# Patient Record
Sex: FEMALE | Employment: OTHER | URBAN - METROPOLITAN AREA
[De-identification: names, ages, dates, MRNs, and addresses within clinical notes are randomized per-mention and may not be internally consistent; named-entity substitution may affect disease eponyms.]

---

## 2017-08-29 ENCOUNTER — APPOINTMENT (OUTPATIENT)
Dept: CT IMAGING | Age: 62
End: 2017-08-29
Attending: EMERGENCY MEDICINE
Payer: COMMERCIAL

## 2017-08-29 ENCOUNTER — APPOINTMENT (OUTPATIENT)
Dept: GENERAL RADIOLOGY | Age: 62
End: 2017-08-29
Attending: PHYSICIAN ASSISTANT
Payer: COMMERCIAL

## 2017-08-29 ENCOUNTER — HOSPITAL ENCOUNTER (EMERGENCY)
Age: 62
Discharge: HOME | End: 2017-08-29
Attending: EMERGENCY MEDICINE
Payer: COMMERCIAL

## 2017-08-29 VITALS
HEART RATE: 54 BPM | BODY MASS INDEX: 19.16 KG/M2 | WEIGHT: 115 LBS | TEMPERATURE: 98.4 F | SYSTOLIC BLOOD PRESSURE: 196 MMHG | RESPIRATION RATE: 17 BRPM | HEIGHT: 65 IN | DIASTOLIC BLOOD PRESSURE: 112 MMHG | OXYGEN SATURATION: 100 %

## 2017-08-29 DIAGNOSIS — D69.6 TEMPORARY LOW PLATELET COUNT (HCC): ICD-10-CM

## 2017-08-29 DIAGNOSIS — J18.9 CAP (COMMUNITY ACQUIRED PNEUMONIA): Primary | ICD-10-CM

## 2017-08-29 LAB
ALBUMIN SERPL-MCNC: 3.4 G/DL (ref 3.5–5)
ALBUMIN/GLOB SERPL: 0.7 {RATIO} (ref 1.1–2.2)
ALP SERPL-CCNC: 146 U/L (ref 45–117)
ALT SERPL-CCNC: 30 U/L (ref 12–78)
ANION GAP SERPL CALC-SCNC: 7 MMOL/L (ref 5–15)
APPEARANCE UR: CLEAR
AST SERPL-CCNC: 55 U/L (ref 15–37)
BASOPHILS # BLD: 0 K/UL (ref 0–0.1)
BASOPHILS NFR BLD: 0 % (ref 0–1)
BILIRUB SERPL-MCNC: 0.4 MG/DL (ref 0.2–1)
BILIRUB UR QL: NEGATIVE
BUN SERPL-MCNC: 16 MG/DL (ref 6–20)
BUN/CREAT SERPL: 12 (ref 12–20)
CALCIUM SERPL-MCNC: 8.7 MG/DL (ref 8.5–10.1)
CHLORIDE SERPL-SCNC: 99 MMOL/L (ref 97–108)
CO2 SERPL-SCNC: 27 MMOL/L (ref 21–32)
COLOR UR: NORMAL
CREAT SERPL-MCNC: 1.34 MG/DL (ref 0.55–1.02)
EOSINOPHIL # BLD: 0.2 K/UL (ref 0–0.4)
EOSINOPHIL NFR BLD: 4 % (ref 0–7)
ERYTHROCYTE [DISTWIDTH] IN BLOOD BY AUTOMATED COUNT: 12.3 % (ref 11.5–14.5)
GLOBULIN SER CALC-MCNC: 5 G/DL (ref 2–4)
GLUCOSE SERPL-MCNC: 92 MG/DL (ref 65–100)
GLUCOSE UR STRIP.AUTO-MCNC: NEGATIVE MG/DL
HCT VFR BLD AUTO: 35.4 % (ref 35–47)
HGB BLD-MCNC: 12.2 G/DL (ref 11.5–16)
HGB UR QL STRIP: NEGATIVE
INR PPP: 1.1 (ref 0.9–1.1)
KETONES UR QL STRIP.AUTO: NEGATIVE MG/DL
LEUKOCYTE ESTERASE UR QL STRIP.AUTO: NEGATIVE
LYMPHOCYTES # BLD: 2.3 K/UL (ref 0.8–3.5)
LYMPHOCYTES NFR BLD: 51 % (ref 12–49)
MAGNESIUM SERPL-MCNC: 2.1 MG/DL (ref 1.6–2.4)
MCH RBC QN AUTO: 33.5 PG (ref 26–34)
MCHC RBC AUTO-ENTMCNC: 34.5 G/DL (ref 30–36.5)
MCV RBC AUTO: 97.3 FL (ref 80–99)
MONOCYTES # BLD: 0.7 K/UL (ref 0–1)
MONOCYTES NFR BLD: 16 % (ref 5–13)
NEUTS SEG # BLD: 1.3 K/UL (ref 1.8–8)
NEUTS SEG NFR BLD: 29 % (ref 32–75)
NITRITE UR QL STRIP.AUTO: NEGATIVE
PH UR STRIP: 5.5 [PH] (ref 5–8)
PLATELET # BLD AUTO: 87 K/UL (ref 150–400)
POTASSIUM SERPL-SCNC: 4.2 MMOL/L (ref 3.5–5.1)
PROT SERPL-MCNC: 8.4 G/DL (ref 6.4–8.2)
PROT UR STRIP-MCNC: NEGATIVE MG/DL
PROTHROMBIN TIME: 11.4 SEC (ref 9–11.1)
RBC # BLD AUTO: 3.64 M/UL (ref 3.8–5.2)
SODIUM SERPL-SCNC: 133 MMOL/L (ref 136–145)
SP GR UR REFRACTOMETRY: <1.005 (ref 1–1.03)
TROPONIN I SERPL-MCNC: <0.04 NG/ML
UROBILINOGEN UR QL STRIP.AUTO: 0.2 EU/DL (ref 0.2–1)
WBC # BLD AUTO: 4.5 K/UL (ref 3.6–11)

## 2017-08-29 PROCEDURE — 96360 HYDRATION IV INFUSION INIT: CPT

## 2017-08-29 PROCEDURE — 84484 ASSAY OF TROPONIN QUANT: CPT | Performed by: PHYSICIAN ASSISTANT

## 2017-08-29 PROCEDURE — 94762 N-INVAS EAR/PLS OXIMTRY CONT: CPT

## 2017-08-29 PROCEDURE — 74011250636 HC RX REV CODE- 250/636: Performed by: PHYSICIAN ASSISTANT

## 2017-08-29 PROCEDURE — 93005 ELECTROCARDIOGRAM TRACING: CPT

## 2017-08-29 PROCEDURE — 74011250637 HC RX REV CODE- 250/637: Performed by: PHYSICIAN ASSISTANT

## 2017-08-29 PROCEDURE — 85610 PROTHROMBIN TIME: CPT | Performed by: PHYSICIAN ASSISTANT

## 2017-08-29 PROCEDURE — 71275 CT ANGIOGRAPHY CHEST: CPT

## 2017-08-29 PROCEDURE — 83735 ASSAY OF MAGNESIUM: CPT | Performed by: PHYSICIAN ASSISTANT

## 2017-08-29 PROCEDURE — 85025 COMPLETE CBC W/AUTO DIFF WBC: CPT | Performed by: PHYSICIAN ASSISTANT

## 2017-08-29 PROCEDURE — 99285 EMERGENCY DEPT VISIT HI MDM: CPT

## 2017-08-29 PROCEDURE — 80053 COMPREHEN METABOLIC PANEL: CPT | Performed by: PHYSICIAN ASSISTANT

## 2017-08-29 PROCEDURE — 74011636320 HC RX REV CODE- 636/320: Performed by: RADIOLOGY

## 2017-08-29 PROCEDURE — 81003 URINALYSIS AUTO W/O SCOPE: CPT | Performed by: PHYSICIAN ASSISTANT

## 2017-08-29 PROCEDURE — 71020 XR CHEST PA LAT: CPT

## 2017-08-29 PROCEDURE — L3809 WHFO W/O JOINTS PRE OTS: HCPCS

## 2017-08-29 PROCEDURE — 36415 COLL VENOUS BLD VENIPUNCTURE: CPT | Performed by: PHYSICIAN ASSISTANT

## 2017-08-29 RX ORDER — CLONIDINE HYDROCHLORIDE 0.1 MG/1
0.2 TABLET ORAL
Status: COMPLETED | OUTPATIENT
Start: 2017-08-29 | End: 2017-08-29

## 2017-08-29 RX ORDER — AMLODIPINE BESYLATE 5 MG/1
5 TABLET ORAL DAILY
COMMUNITY

## 2017-08-29 RX ORDER — LEVOFLOXACIN 750 MG/1
750 TABLET ORAL DAILY
COMMUNITY
End: 2022-01-07

## 2017-08-29 RX ORDER — CLONIDINE HYDROCHLORIDE 0.2 MG/1
0.2 TABLET ORAL 2 TIMES DAILY
COMMUNITY

## 2017-08-29 RX ADMIN — IOPAMIDOL 84 ML: 755 INJECTION, SOLUTION INTRAVENOUS at 19:18

## 2017-08-29 RX ADMIN — CLONIDINE HYDROCHLORIDE 0.2 MG: 0.1 TABLET ORAL at 17:54

## 2017-08-29 RX ADMIN — SODIUM CHLORIDE 1000 ML: 900 INJECTION, SOLUTION INTRAVENOUS at 18:59

## 2017-08-29 NOTE — ED PROVIDER NOTES
HPI Comments: Angelika Arnold is a 60 y.o. female who presents ambulatory to the ED with a c/o being sent by her pcp for a low platelet/ abnormal labs. Pt notes she was inpatient at [facility] last week for pna. She was at her pcp yesterday for a follow up appt. Dr Leah Mahoney called and sent pt to the ER based on her labs. She states she has continued to feel fatigued and felt like she could fall x 2 today. She states she still has sob. She is talking levaquin for her infection. She states she normally takes clonidine and norvasc for her bp and did not take her medications today as she did not feel well. Pt notes she had dark stools when she was admitted to [facility], but denies dark stool now. She had loose stools 2 days ago and reports constipation since. She denies f/c, cp, abd pain, dysuria, hematuria, rash or weight loss. Pt denies a hx of bleeding problems in the past. Pt states she did not want to go back to [facility] as they \"did not make me feel better or figure out what was wrong with me. \"    PCP: Faith Solano MD  PMHx significant for: Past Medical History:  No date: Hypertension  PSHx significant for: . History reviewed. No pertinent surgical history. Social Hx: Tobacco: denies current EtOH: 8+ beers daily Illicit drug use: denies     There are no further complaints or symptoms at this time. The history is provided by the patient and a relative (son). No past medical history on file. No past surgical history on file. No family history on file. Social History     Social History    Marital status: N/A     Spouse name: N/A    Number of children: N/A    Years of education: N/A     Occupational History    Not on file.      Social History Main Topics    Smoking status: Not on file    Smokeless tobacco: Not on file    Alcohol use Not on file    Drug use: Not on file    Sexual activity: Not on file     Other Topics Concern    Not on file     Social History Narrative ALLERGIES: Review of patient's allergies indicates no known allergies. Review of Systems   Constitutional: Positive for fatigue. Negative for chills and fever. HENT: Negative for congestion, rhinorrhea, sneezing and sore throat. Eyes: Negative for redness and visual disturbance. Respiratory: Positive for shortness of breath. Cardiovascular: Negative for chest pain and leg swelling. Gastrointestinal: Positive for diarrhea and nausea. Negative for abdominal pain and vomiting. Genitourinary: Negative for difficulty urinating and frequency. Musculoskeletal: Negative for back pain, myalgias and neck stiffness. Skin: Negative for rash. Neurological: Positive for weakness. Negative for dizziness, syncope and headaches. Hematological: Negative for adenopathy. Patient Vitals for the past 12 hrs:   Temp Pulse Resp BP SpO2   08/29/17 1845 - 61 15 (!) 163/103 99 %   08/29/17 1830 - 60 15 (!) 162/107 100 %   08/29/17 1815 - 65 13 (!) 213/116 99 %   08/29/17 1754 - 60 - (!) 183/109 -   08/29/17 1745 - 61 11 (!) 204/106 100 %   08/29/17 1710 - - - (!) 198/115 -   08/29/17 1709 98.4 °F (36.9 °C) 67 16 (!) 217/121 100 %              Physical Exam   Constitutional: She is oriented to person, place, and time. She appears well-developed and well-nourished. No distress. Chronically ill appearing   HENT:   Head: Normocephalic and atraumatic. Right Ear: External ear normal.   Left Ear: External ear normal.   Nose: Nose normal.   Mouth/Throat: Oropharynx is clear and moist. No oropharyngeal exudate. Eyes: EOM are normal. Pupils are equal, round, and reactive to light. Neck: Neck supple. No JVD present. No tracheal deviation present. Cardiovascular: Normal rate, regular rhythm, normal heart sounds and intact distal pulses. Exam reveals no gallop and no friction rub. No murmur heard. Pulmonary/Chest: Effort normal and breath sounds normal. No stridor. No respiratory distress.  She has no wheezes. She has no rales. She exhibits no tenderness. Abdominal: Soft. Bowel sounds are normal. She exhibits no distension and no mass. There is no tenderness. There is no rebound and no guarding. Musculoskeletal: Normal range of motion. She exhibits no edema, tenderness or deformity. Lymphadenopathy:     She has no cervical adenopathy. Neurological: She is alert and oriented to person, place, and time. No cranial nerve deficit. Coordination normal.   Skin: No rash noted. No erythema. No pallor. Psychiatric: She has a normal mood and affect. Her behavior is normal.   Nursing note and vitals reviewed. MDM  Number of Diagnoses or Management Options     Amount and/or Complexity of Data Reviewed  Clinical lab tests: reviewed and ordered  Tests in the radiology section of CPT®: ordered and reviewed  Tests in the medicine section of CPT®: ordered and reviewed  Obtain history from someone other than the patient: yes (son)  Review and summarize past medical records: yes  Independent visualization of images, tracings, or specimens: yes    Patient Progress  Patient progress: stable    ED Course       Procedures    5:29 PM  Discussed with patient at length the need for blood pressure re-evaluation and management with primary care. Discussed the long term sequelae for elevated blood pressure including blindness, CVA, MI, and renal failure/ dialysis. Pt agrees to follow up as directed. ALANNA Luna     5:30 PM  Discussed with the patient the medical risks of excessive daily alcohol intake and advised the patient of the benefits of the decreasing alcohol intake  ALANNA uLna    5:31 PM  Discussed pt, sx, hx and current findings with Dr Doreen Narvaez. He is in agreement with plan and will see pt  Nicol Hercules PA-C      ED EKG interpretation: 6:13PM  Rhythm: sinus bradycardia with sinus arrhythmia; and regular . Rate (approx.): 59;  Axis: normal; P wave: normal; QRS interval: normal ; ST/T wave: non-specific ST changes; Other findings: abnormal ekg, LVH. This EKG was interpreted by Sathish Acosta MD,ED Provider. 7:05 PM  Discussed pt's hx, disposition, and available diagnostic and imaging results with Dr Kendra Acosta. Reviewed care plans. Agrees with plans as outlined. Care of pt transferred to Dr Kendra Acosta. Irvin Manjarrez.  VIOLETA Hercules

## 2017-08-29 NOTE — ED TRIAGE NOTES
Pt was sent here by her PCP for follow up on PNA and possibly low platelets. Pt c/o generalized weakness and SOB for a couple of months. Denies n/v/d, and chest pain.

## 2017-08-30 LAB
ATRIAL RATE: 59 BPM
CALCULATED P AXIS, ECG09: 49 DEGREES
CALCULATED R AXIS, ECG10: 70 DEGREES
CALCULATED T AXIS, ECG11: 67 DEGREES
DIAGNOSIS, 93000: NORMAL
P-R INTERVAL, ECG05: 162 MS
Q-T INTERVAL, ECG07: 426 MS
QRS DURATION, ECG06: 90 MS
QTC CALCULATION (BEZET), ECG08: 421 MS
VENTRICULAR RATE, ECG03: 59 BPM

## 2017-08-30 NOTE — ED NOTES
Patient given discharge instruction by Anselmo Hamilton.   Verbalized understanding, pt discharge home with family

## 2017-08-30 NOTE — PROGRESS NOTES
8:01 PM discussed need to finish levaquin as prescribed (started yesterday); also discussed etoh abuse/ low platelets; Pt agrees w/ plans;   Ana Huitron's  results have been reviewed with her. She has been counseled regarding her diagnosis. She verbally conveys understanding and agreement of the signs, symptoms, diagnosis, treatment and prognosis and additionally agrees to Call/ Arrange follow up as recommended with Dr. Solo Fox MD in 24 - 48 hours. She also agrees with the care-plan and conveys that all of her questions have been answered. I have also put together some discharge instructions for her that include: 1) educational information regarding their diagnosis, 2) how to care for their diagnosis at home, as well a 3) list of reasons why they would want to return to the ED prior to their follow-up appointment, should their condition change or for concerns.

## 2017-08-30 NOTE — DISCHARGE INSTRUCTIONS
Pneumonia: Care Instructions  Your Care Instructions    Pneumonia is an infection of the lungs. Most cases are caused by infections from bacteria or viruses. Pneumonia may be mild or very severe. If it is caused by bacteria, you will be treated with antibiotics. It may take a few weeks to a few months to recover fully from pneumonia, depending on how sick you were and whether your overall health is good. Follow-up care is a key part of your treatment and safety. Be sure to make and go to all appointments, and call your doctor if you are having problems. Its also a good idea to know your test results and keep a list of the medicines you take. How can you care for yourself at home? · Take your antibiotics exactly as directed. Do not stop taking the medicine just because you are feeling better. You need to take the full course of antibiotics. · Take your medicines exactly as prescribed. Call your doctor if you think you are having a problem with your medicine. · Get plenty of rest and sleep. You may feel weak and tired for a while, but your energy level will improve with time. · To prevent dehydration, drink plenty of fluids, enough so that your urine is light yellow or clear like water. Choose water and other caffeine-free clear liquids until you feel better. If you have kidney, heart, or liver disease and have to limit fluids, talk with your doctor before you increase the amount of fluids you drink. · Take care of your cough so you can rest. A cough that brings up mucus from your lungs is common with pneumonia. It is one way your body gets rid of the infection. But if coughing keeps you from resting or causes severe fatigue and chest-wall pain, talk to your doctor. He or she may suggest that you take a medicine to reduce the cough. · Use a vaporizer or humidifier to add moisture to your bedroom. Follow the directions for cleaning the machine. · Do not smoke or allow others to smoke around you.  Smoke will make your cough last longer. If you need help quitting, talk to your doctor about stop-smoking programs and medicines. These can increase your chances of quitting for good. · Take an over-the-counter pain medicine, such as acetaminophen (Tylenol), ibuprofen (Advil, Motrin), or naproxen (Aleve). Read and follow all instructions on the label. · Do not take two or more pain medicines at the same time unless the doctor told you to. Many pain medicines have acetaminophen, which is Tylenol. Too much acetaminophen (Tylenol) can be harmful. · If you were given a spirometer to measure how well your lungs are working, use it as instructed. This can help your doctor tell how your recovery is going. · To prevent pneumonia in the future, talk to your doctor about getting a flu vaccine (once a year) and a pneumococcal vaccine (one time only for most people). When should you call for help? Call 911 anytime you think you may need emergency care. For example, call if:  · You have severe trouble breathing. Call your doctor now or seek immediate medical care if:  · You cough up dark brown or bloody mucus (sputum). · You have new or worse trouble breathing. · You are dizzy or lightheaded, or you feel like you may faint. Watch closely for changes in your health, and be sure to contact your doctor if:  · You have a new or higher fever. · You are coughing more deeply or more often. · You are not getting better after 2 days (48 hours). · You do not get better as expected. Where can you learn more? Go to http://dexter-tank.info/. Enter [id number] in the search box to learn more about \"Pneumonia: Care Instructions. \"  Current as of: March 25, 2017  Content Version: 11.3  © 6966-3483 [employer]. Care instructions adapted under license by [employer] (which disclaims liability or warranty for this information).  If you have questions about a medical condition or this instruction, always ask your healthcare professional. [place] [address] any warranty or liability for your use of this information. Alcohol and Drug Problems: Care Instructions  Your Care Instructions  You can improve your life and health by stopping your use of alcohol or drugs. Ending dependency on alcohol or drugs may help you feel and sleep better. You may get along better with your family, friends, and coworkers. There are medicines and programs that can help. Follow-up care is a key part of your treatment and safety. Be sure to make and go to all appointments, and call your doctor if you are having problems. It's also a good idea to know your test results and keep a list of the medicines you take. How can you care for yourself at home? · If you have been given medicine to help keep you sober or reduce your cravings, be sure to take it as prescribed. · Talk to your doctor about programs that can help you stop using drugs or drinking alcohol. · If your doctor prescribes disulfiram (Antabuse), do not drink any alcohol while you are taking this medicine. You may have severe, even life-threatening, side effects from even small amounts of alcohol. · Do not tempt yourself by keeping alcohol or drugs in your home. · Learn how to say no when other people drink or use drugs. Or don't spend time with people who drink or use drugs. · Use the time and money spent on drinking or drugs to do something fun with your family or friends. Preventing a relapse  · Do not drink alcohol or use drugs at all. Using any amount of alcohol or drugs greatly increases your risk for relapse. · Seek help from organizations such as Alcoholics Anonymous, Narcotics Anonymous, or treatment facilities if you feel the need to drink alcohol or use drugs again. · Remember that recovery is a lifelong process. · Stay away from situations, friends, or places that may lead you to drink or use drugs.   · Have a plan to spot and deal with relapse. Learn to recognize changes in your thinking that lead you to drink or use drugs. These are warning signs. Get help before you start to drink or use drugs again. · Get help as soon as you can if you relapse. Some people make a plan with another person that outlines what they want that person to do for them if they relapse. The plan usually includes how to handle the relapse and who to notify in case of relapse. · Don't give up. Remember that a relapse does not mean that you have failed. Use the experience to learn the triggers that lead you to drink or use drugs. Then quit again. Many people have several relapses before they are able to quit for good. When should you call for help? Call 911 anytime you think you may need emergency care. For example, call if:  · You feel you cannot stop from hurting yourself or someone else. Call your doctor now or seek immediate medical care if:  · You have serious withdrawal symptoms such as confusion, hallucinations, or severe trembling. Watch closely for changes in your health, and be sure to contact your doctor if:  · You have a relapse. · You need more help or support to stop. Where can you learn more? Go to http://dexter-tank.info/. Enter [id number] in the search box to learn more about \"Alcohol and Drug Problems: Care Instructions. \"  Current as of: November 3, 2016  Content Version: 11.3  © 6560-6865 [employer]. Care instructions adapted under license by [employer] (which disclaims liability or warranty for this information). If you have questions about a medical condition or this instruction, always ask your healthcare professional. [place] [address] any warranty or liability for your use of this information. We hope that we have addressed all of your medical concerns.  The examination and treatment you received in the Emergency Department were for an emergent problem and were not intended as complete care. It is important that you follow up with your healthcare provider(s) for ongoing care. If your symptoms worsen or do not improve as expected, and you are unable to reach your usual health care provider(s), you should return to the Emergency Department. Today's healthcare is undergoing tremendous change, and patient satisfaction surveys are one of the many tools to assess the quality of medical care. You may receive a survey from the raksul regarding your experience in the Emergency Department. I hope that your experience has been completely positive, particularly the medical care that I provided. As such, please participate in the survey; anything less than excellent does not meet my expectations or intentions. [facility] and [facility] participate in nationally recognized quality of care measures. If your blood pressure is greater than 120/80, as reported below, we urge that you seek medical care to address the potential of high blood pressure, commonly known as hypertension. Hypertension can be hereditary or can be caused by certain medical conditions, pain, stress, or \"white coat syndrome. \"       Please make an appointment with your health care provider(s) for follow up of your Emergency Department visit. VITALS:   Patient Vitals for the past 8 hrs:   Temp Pulse Resp BP SpO2   08/29/17 1915 - (!) 58 15 (!) 156/103 100 %   08/29/17 1900 - (!) 58 16 (!) 171/105 100 %   08/29/17 1845 - 61 15 (!) 163/103 99 %   08/29/17 1830 - 60 15 (!) 162/107 100 %   08/29/17 1815 - 65 13 (!) 213/116 99 %   08/29/17 1754 - 60 - (!) 183/109 -   08/29/17 1745 - 61 11 (!) 204/106 100 %   08/29/17 1710 - - - (!) 198/115 -   08/29/17 1709 98.4 °F (36.9 °C) 67 16 (!) 217/121 100 %          Thank you for allowing us to provide you with medical care today. We realize that you have many choices for your emergency care needs.   Please choose us in the future for any continued health care needs. Milly Acosta, [facility].   Office: [phone]            Recent Results (from the past 24 hour(s))   PROTHROMBIN TIME + INR    Collection Time: 08/29/17  5:42 PM   Result Value Ref Range    INR 1.1 0.9 - 1.1      Prothrombin time 11.4 (H) 9.0 - 11.1 sec   CBC WITH AUTOMATED DIFF    Collection Time: 08/29/17  5:43 PM   Result Value Ref Range    WBC 4.5 3.6 - 11.0 K/uL    RBC 3.64 (L) 3.80 - 5.20 M/uL    HGB 12.2 11.5 - 16.0 g/dL    HCT 35.4 35.0 - 47.0 %    MCV 97.3 80.0 - 99.0 FL    MCH 33.5 26.0 - 34.0 PG    MCHC 34.5 30.0 - 36.5 g/dL    RDW 12.3 11.5 - 14.5 %    PLATELET 87 (L) 746 - 400 K/uL    NEUTROPHILS 29 (L) 32 - 75 %    LYMPHOCYTES 51 (H) 12 - 49 %    MONOCYTES 16 (H) 5 - 13 %    EOSINOPHILS 4 0 - 7 %    BASOPHILS 0 0 - 1 %    ABS. NEUTROPHILS 1.3 (L) 1.8 - 8.0 K/UL    ABS. LYMPHOCYTES 2.3 0.8 - 3.5 K/UL    ABS. MONOCYTES 0.7 0.0 - 1.0 K/UL    ABS. EOSINOPHILS 0.2 0.0 - 0.4 K/UL    ABS. BASOPHILS 0.0 0.0 - 0.1 K/UL   METABOLIC PANEL, COMPREHENSIVE    Collection Time: 08/29/17  5:43 PM   Result Value Ref Range    Sodium 133 (L) 136 - 145 mmol/L    Potassium 4.2 3.5 - 5.1 mmol/L    Chloride 99 97 - 108 mmol/L    CO2 27 21 - 32 mmol/L    Anion gap 7 5 - 15 mmol/L    Glucose 92 65 - 100 mg/dL    BUN 16 6 - 20 MG/DL    Creatinine 1.34 (H) 0.55 - 1.02 MG/DL    BUN/Creatinine ratio 12 12 - 20      GFR est AA 48 (L) >60 ml/min/1.73m2    GFR est non-AA 40 (L) >60 ml/min/1.73m2    Calcium 8.7 8.5 - 10.1 MG/DL    Bilirubin, total 0.4 0.2 - 1.0 MG/DL    ALT (SGPT) 30 12 - 78 U/L    AST (SGOT) 55 (H) 15 - 37 U/L    Alk.  phosphatase 146 (H) 45 - 117 U/L    Protein, total 8.4 (H) 6.4 - 8.2 g/dL    Albumin 3.4 (L) 3.5 - 5.0 g/dL    Globulin 5.0 (H) 2.0 - 4.0 g/dL    A-G Ratio 0.7 (L) 1.1 - 2.2     MAGNESIUM    Collection Time: 08/29/17  5:43 PM   Result Value Ref Range    Magnesium 2.1 1.6 - 2.4 mg/dL   URINALYSIS W/ RFLX MICROSCOPIC    Collection Time: 08/29/17  5:43 PM   Result Value Ref Range    Color YELLOW/STRAW      Appearance CLEAR CLEAR      Specific gravity <1.005 1.003 - 1.030    pH (UA) 5.5 5.0 - 8.0      Protein NEGATIVE  NEG mg/dL    Glucose NEGATIVE  NEG mg/dL    Ketone NEGATIVE  NEG mg/dL    Bilirubin NEGATIVE  NEG      Blood NEGATIVE  NEG      Urobilinogen 0.2 0.2 - 1.0 EU/dL    Nitrites NEGATIVE  NEG      Leukocyte Esterase NEGATIVE  NEG     TROPONIN I    Collection Time: 08/29/17  5:43 PM   Result Value Ref Range    Troponin-I, Qt. <0.04 <0.05 ng/mL   EKG, 12 LEAD, INITIAL    Collection Time: 08/29/17  6:13 PM   Result Value Ref Range    Ventricular Rate 59 BPM    Atrial Rate 59 BPM    P-R Interval 162 ms    QRS Duration 90 ms    Q-T Interval 426 ms    QTC Calculation (Bezet) 421 ms    Calculated P Axis 49 degrees    Calculated R Axis 70 degrees    Calculated T Axis 67 degrees    Diagnosis       Sinus bradycardia with sinus arrhythmia  Minimal voltage criteria for LVH, may be normal variant  ST elevation, consider early repolarization, pericarditis, or injury  Abnormal ECG  No previous ECGs available         Xr Chest Pa Lat    Result Date: 8/29/2017  EXAM:  XR CHEST PA LAT INDICATION:  sob, recent pna COMPARISON:  None FINDINGS: PA and lateral radiographs of the chest demonstrate patchy airspace disease in the right middle lobe and probably in the left lower lobe, suspicious for pneumonia. The lungs are otherwise clear. The cardiac and mediastinal contours and pulmonary vascularity are normal.   The bones and soft tissues are unremarkable. IMPRESSION: Patchy right middle lobe airspace disease and possible patchy left lower lobe airspace disease. Cta Chest W Or W Wo Cont    Result Date: 8/29/2017  EXAM:  CTA CHEST W OR W WO CONT INDICATION:  sob/ PNA COMPARISON: Chest radiograph of earlier in the day TECHNIQUE: Precontrast  images were obtained to localize the volume for acquisition.  Multislice helical CT arteriography was performed from the diaphragm to the thoracic inlet during uneventful rapid bolus intravenous administration of 84 mL of Isovue  370. Lung and soft tissue windows were generated. Post processing was performed to include 3D MIP  and coronal and sagittal reformatted images. CT dose reduction was achieved through the use of a standardized protocol tailored for this examination and automatic exposure control for dose modulation. FINDINGS: The pulmonary arteries are well enhanced and no pulmonary emboli are identified. No parenchymal nodules or masses are demonstrated. There is no mediastinal or hilar adenopathy or mass. The aorta enhances normally without evidence of aneurysm or dissection. There is patchy right middle lobe airspace disease. The lungs are otherwise clear. There is no pleural or pericardial fluid. No significant abnormalities are seen in the upper abdomen. There are no significant bony abnormalities. IMPRESSION: 1. No evidence of pulmonary thromboembolism. 2. Patchy right middle lobe airspace disease. Lungs otherwise clear. Modeliniaa William

## 2018-11-12 ENCOUNTER — OP HISTORICAL/CONVERTED ENCOUNTER (OUTPATIENT)
Dept: OTHER | Age: 63
End: 2018-11-12

## 2020-03-02 ENCOUNTER — OP HISTORICAL/CONVERTED ENCOUNTER (OUTPATIENT)
Dept: OTHER | Age: 65
End: 2020-03-02

## 2021-12-22 ENCOUNTER — HOSPITAL ENCOUNTER (INPATIENT)
Age: 66
LOS: 16 days | Discharge: NURSING, REHAB OR LONG-TERM CARE | DRG: 100 | End: 2022-01-07
Attending: EMERGENCY MEDICINE | Admitting: INTERNAL MEDICINE
Payer: MEDICARE

## 2021-12-22 ENCOUNTER — APPOINTMENT (OUTPATIENT)
Dept: CT IMAGING | Age: 66
DRG: 100 | End: 2021-12-22
Attending: EMERGENCY MEDICINE
Payer: MEDICARE

## 2021-12-22 DIAGNOSIS — R56.9 SEIZURE (HCC): ICD-10-CM

## 2021-12-22 DIAGNOSIS — I16.1 HYPERTENSIVE EMERGENCY: Primary | ICD-10-CM

## 2021-12-22 LAB
ALBUMIN SERPL-MCNC: 2.8 G/DL (ref 3.5–5)
ALBUMIN/GLOB SERPL: 0.7 {RATIO} (ref 1.1–2.2)
ALP SERPL-CCNC: 59 U/L (ref 45–117)
ALT SERPL-CCNC: 9 U/L (ref 12–78)
ANION GAP SERPL CALC-SCNC: 8 MMOL/L (ref 5–15)
AST SERPL W P-5'-P-CCNC: 11 U/L (ref 15–37)
BASOPHILS # BLD: 0 K/UL (ref 0–0.1)
BASOPHILS NFR BLD: 0 % (ref 0–1)
BILIRUB SERPL-MCNC: 0.3 MG/DL (ref 0.2–1)
BUN SERPL-MCNC: 27 MG/DL (ref 6–20)
BUN/CREAT SERPL: 21 (ref 12–20)
CA-I BLD-MCNC: 8.8 MG/DL (ref 8.5–10.1)
CHLORIDE SERPL-SCNC: 108 MMOL/L (ref 97–108)
CO2 SERPL-SCNC: 26 MMOL/L (ref 21–32)
CREAT SERPL-MCNC: 1.29 MG/DL (ref 0.55–1.02)
DIFFERENTIAL METHOD BLD: ABNORMAL
EOSINOPHIL # BLD: 0.1 K/UL (ref 0–0.4)
EOSINOPHIL NFR BLD: 1 % (ref 0–7)
ERYTHROCYTE [DISTWIDTH] IN BLOOD BY AUTOMATED COUNT: 13.9 % (ref 11.5–14.5)
GLOBULIN SER CALC-MCNC: 4 G/DL (ref 2–4)
GLUCOSE SERPL-MCNC: 116 MG/DL (ref 65–100)
HCT VFR BLD AUTO: 32.3 % (ref 35–47)
HGB BLD-MCNC: 10.7 G/DL (ref 11.5–16)
IMM GRANULOCYTES # BLD AUTO: 0.1 K/UL (ref 0–0.04)
IMM GRANULOCYTES NFR BLD AUTO: 1 % (ref 0–0.5)
LYMPHOCYTES # BLD: 3.4 K/UL (ref 0.8–3.5)
LYMPHOCYTES NFR BLD: 47 % (ref 12–49)
MCH RBC QN AUTO: 34.1 PG (ref 26–34)
MCHC RBC AUTO-ENTMCNC: 33.1 G/DL (ref 30–36.5)
MCV RBC AUTO: 102.9 FL (ref 80–99)
MONOCYTES # BLD: 0.8 K/UL (ref 0–1)
MONOCYTES NFR BLD: 10 % (ref 5–13)
NEUTS SEG # BLD: 3 K/UL (ref 1.8–8)
NEUTS SEG NFR BLD: 41 % (ref 32–75)
NRBC # BLD: 0 K/UL (ref 0–0.01)
NRBC BLD-RTO: 0 PER 100 WBC
PLATELET # BLD AUTO: 124 K/UL (ref 150–400)
PMV BLD AUTO: 12 FL (ref 8.9–12.9)
POTASSIUM SERPL-SCNC: 3.4 MMOL/L (ref 3.5–5.1)
PROT SERPL-MCNC: 6.8 G/DL (ref 6.4–8.2)
RBC # BLD AUTO: 3.14 M/UL (ref 3.8–5.2)
SODIUM SERPL-SCNC: 142 MMOL/L (ref 136–145)
WBC # BLD AUTO: 7.4 K/UL (ref 3.6–11)

## 2021-12-22 PROCEDURE — 95816 EEG AWAKE AND DROWSY: CPT | Performed by: INTERNAL MEDICINE

## 2021-12-22 PROCEDURE — 36415 COLL VENOUS BLD VENIPUNCTURE: CPT

## 2021-12-22 PROCEDURE — 70450 CT HEAD/BRAIN W/O DYE: CPT

## 2021-12-22 PROCEDURE — 65270000029 HC RM PRIVATE

## 2021-12-22 PROCEDURE — 93005 ELECTROCARDIOGRAM TRACING: CPT

## 2021-12-22 PROCEDURE — 85025 COMPLETE CBC W/AUTO DIFF WBC: CPT

## 2021-12-22 PROCEDURE — 74011250637 HC RX REV CODE- 250/637: Performed by: EMERGENCY MEDICINE

## 2021-12-22 PROCEDURE — 74011250636 HC RX REV CODE- 250/636: Performed by: EMERGENCY MEDICINE

## 2021-12-22 PROCEDURE — 99285 EMERGENCY DEPT VISIT HI MDM: CPT

## 2021-12-22 PROCEDURE — 80177 DRUG SCRN QUAN LEVETIRACETAM: CPT

## 2021-12-22 PROCEDURE — 96365 THER/PROPH/DIAG IV INF INIT: CPT

## 2021-12-22 PROCEDURE — 80053 COMPREHEN METABOLIC PANEL: CPT

## 2021-12-22 RX ORDER — SODIUM CHLORIDE 0.9 % (FLUSH) 0.9 %
5-40 SYRINGE (ML) INJECTION EVERY 8 HOURS
Status: DISCONTINUED | OUTPATIENT
Start: 2021-12-22 | End: 2022-01-03

## 2021-12-22 RX ORDER — CLONIDINE HYDROCHLORIDE 0.1 MG/1
0.2 TABLET ORAL
Status: COMPLETED | OUTPATIENT
Start: 2021-12-22 | End: 2021-12-22

## 2021-12-22 RX ORDER — LEVETIRACETAM 10 MG/ML
1000 INJECTION INTRAVASCULAR ONCE
Status: COMPLETED | OUTPATIENT
Start: 2021-12-22 | End: 2021-12-22

## 2021-12-22 RX ORDER — HYDRALAZINE HYDROCHLORIDE 20 MG/ML
10 INJECTION INTRAMUSCULAR; INTRAVENOUS ONCE
Status: DISCONTINUED | OUTPATIENT
Start: 2021-12-22 | End: 2021-12-22

## 2021-12-22 RX ORDER — OXYCODONE HYDROCHLORIDE 5 MG/1
5 TABLET ORAL
Status: DISCONTINUED | OUTPATIENT
Start: 2021-12-22 | End: 2022-01-07 | Stop reason: HOSPADM

## 2021-12-22 RX ORDER — LEVETIRACETAM 5 MG/ML
500 INJECTION INTRAVASCULAR EVERY 12 HOURS
Status: DISCONTINUED | OUTPATIENT
Start: 2021-12-22 | End: 2021-12-23

## 2021-12-22 RX ORDER — LORAZEPAM 2 MG/ML
1 INJECTION INTRAMUSCULAR
Status: DISCONTINUED | OUTPATIENT
Start: 2021-12-22 | End: 2022-01-07 | Stop reason: HOSPADM

## 2021-12-22 RX ORDER — BISACODYL 5 MG
5 TABLET, DELAYED RELEASE (ENTERIC COATED) ORAL DAILY PRN
Status: DISCONTINUED | OUTPATIENT
Start: 2021-12-22 | End: 2022-01-07 | Stop reason: HOSPADM

## 2021-12-22 RX ORDER — ENOXAPARIN SODIUM 100 MG/ML
40 INJECTION SUBCUTANEOUS EVERY 24 HOURS
Status: DISCONTINUED | OUTPATIENT
Start: 2021-12-22 | End: 2021-12-23

## 2021-12-22 RX ORDER — SODIUM CHLORIDE 0.9 % (FLUSH) 0.9 %
5-40 SYRINGE (ML) INJECTION AS NEEDED
Status: DISCONTINUED | OUTPATIENT
Start: 2021-12-22 | End: 2022-01-07 | Stop reason: HOSPADM

## 2021-12-22 RX ADMIN — LEVETIRACETAM 1000 MG: 10 INJECTION INTRAVENOUS at 18:30

## 2021-12-22 RX ADMIN — CLONIDINE HYDROCHLORIDE 0.2 MG: 0.1 TABLET ORAL at 18:27

## 2021-12-22 NOTE — PROGRESS NOTES
12/22/21. Son Jesus Diggs @ [phone]) requested CM to call regarding PT/OT for pt. Son declined home health - stated he was interested in outpt therapy only for his mother - stated in home rehab would be an inconvience to him & his mother needed more therapy then home health would provide. Son to contact pts PCP.

## 2021-12-22 NOTE — Clinical Note
Status[de-identified] INPATIENT [101]   Type of Bed: Remote Telemetry [29]   Cardiac Monitoring Required?: Yes   Inpatient Hospitalization Certified Necessary for the Following Reasons: 3.  Patient receiving treatment that can only be provided in an inpatient setting (further clarification in H&P documentation)   Admitting Diagnosis: Hypertensive emergency [[id number]]   Admitting Physician: Ronaldo Castillo   Attending Physician: Ronaldo Castillo   Estimated Length of Stay: 3-4 Midnights   Discharge Plan[de-identified] Home with Office Follow-up

## 2021-12-22 NOTE — ED TRIAGE NOTES
Patient from home, hx of cva and sz, ems called for sz today  Patient is alert and o x2 2.5 versed given IN 2.5 given IV.

## 2021-12-22 NOTE — ED PROVIDER NOTES
EMERGENCY DEPARTMENT HISTORY AND PHYSICAL EXAM      Date: 12/22/2021  Patient Name: Rudi Rae      History of Presenting Illness     Chief Complaint   Patient presents with    Seizure       History Provided By: Patient    HPI: Rudi Rae, 78 y.o. female with a past medical history significant hypertension and stroke presents to the ED with cc of seizure. Per report, patient has been having seizures today. She does have a history of seizures and takes Keppra for this. Patient is also found to be very hypertensive. She does carry a diagnosis of hypertension. Unknown if she is taking her antihypertensives. Here patient is able to deny having any pain however she does not recall having seizures today. She denies any recent illness. She is limited secondary to patient's clinical condition. Of note, patient has left facial droop and left eyelid weakness. Unknown if this is new or old. Last known well is unknown. Attempted to contact family however was not able to get through to anyone. Made a level 2 stroke alert. There are no other complaints, changes, or physical findings at this time. PCP: Humberto Chávez MD    Current Facility-Administered Medications   Medication Dose Route Frequency Provider Last Rate Last Admin    hydrALAZINE (APRESOLINE) 20 mg/mL injection 10 mg  10 mg IntraVENous Herny García MD         Current Outpatient Medications   Medication Sig Dispense Refill    amLODIPine (NORVASC) 5 mg tablet Take 5 mg by mouth daily.  cloNIDine HCl (CATAPRES) 0.2 mg tablet Take 0.2 mg by mouth two (2) times a day.  levoFLOXacin (LEVAQUIN) 750 mg tablet Take 750 mg by mouth daily. Past History     Past Medical History:  Past Medical History:   Diagnosis Date    Hypertension    CVA    Past Surgical History:  No past surgical history on file. Family History:  No family history on file.     Social History:  Social History     Tobacco Use    Smoking status: Former Smoker    Smokeless tobacco: Never Used   Substance Use Topics    Alcohol use: Yes     Alcohol/week: 56.0 standard drinks     Types: 56 Cans of beer per week    Drug use: No       Allergies:  No Known Allergies      Review of Systems     Review of Systems   Unable to perform ROS: Acuity of condition       Physical Exam     Physical Exam  Vitals and nursing note reviewed. Constitutional:       General: She is not in acute distress. Appearance: Normal appearance. She is ill-appearing. She is not toxic-appearing or diaphoretic. HENT:      Head: Normocephalic and atraumatic. Nose: Nose normal.      Mouth/Throat:      Mouth: Mucous membranes are moist.   Eyes:      Extraocular Movements: Extraocular movements intact. Pupils: Pupils are equal, round, and reactive to light. Cardiovascular:      Rate and Rhythm: Normal rate and regular rhythm. Heart sounds: Normal heart sounds. Pulmonary:      Breath sounds: Normal breath sounds. Abdominal:      Tenderness: There is no abdominal tenderness. Musculoskeletal:         General: No deformity. Normal range of motion. Cervical back: No tenderness. Skin:     General: Skin is warm and dry. Findings: No rash. Neurological:      Mental Status: She is alert. Comments: Sided facial droop. Left upper and lower extremity weakness. Right upper and lower extremity are normal.  Slow to answer questions.    Psychiatric:         Mood and Affect: Mood normal.         Lab and Diagnostic Study Results     Labs -     Recent Results (from the past 12 hour(s))   CBC WITH AUTOMATED DIFF    Collection Time: 12/22/21  2:30 PM   Result Value Ref Range    WBC 7.4 3.6 - 11.0 K/uL    RBC 3.14 (L) 3.80 - 5.20 M/uL    HGB 10.7 (L) 11.5 - 16.0 g/dL    HCT 32.3 (L) 35.0 - 47.0 %    .9 (H) 80.0 - 99.0 FL    MCH 34.1 (H) 26.0 - 34.0 PG    MCHC 33.1 30.0 - 36.5 g/dL    RDW 13.9 11.5 - 14.5 %    PLATELET 983 (L) 325 - 400 K/uL    MPV 12.0 8.9 - 12.9 FL    NRBC 0.0 0.0  WBC    ABSOLUTE NRBC 0.00 0.00 - 0.01 K/uL    NEUTROPHILS 41 32 - 75 %    LYMPHOCYTES 47 12 - 49 %    MONOCYTES 10 5 - 13 %    EOSINOPHILS 1 0 - 7 %    BASOPHILS 0 0 - 1 %    IMMATURE GRANULOCYTES 1 (H) 0 - 0.5 %    ABS. NEUTROPHILS 3.0 1.8 - 8.0 K/UL    ABS. LYMPHOCYTES 3.4 0.8 - 3.5 K/UL    ABS. MONOCYTES 0.8 0.0 - 1.0 K/UL    ABS. EOSINOPHILS 0.1 0.0 - 0.4 K/UL    ABS. BASOPHILS 0.0 0.0 - 0.1 K/UL    ABS. IMM. GRANS. 0.1 (H) 0.00 - 0.04 K/UL    DF AUTOMATED     METABOLIC PANEL, COMPREHENSIVE    Collection Time: 12/22/21  2:30 PM   Result Value Ref Range    Sodium 142 136 - 145 mmol/L    Potassium 3.4 (L) 3.5 - 5.1 mmol/L    Chloride 108 97 - 108 mmol/L    CO2 26 21 - 32 mmol/L    Anion gap 8 5 - 15 mmol/L    Glucose 116 (H) 65 - 100 mg/dL    BUN 27 (H) 6 - 20 mg/dL    Creatinine 1.29 (H) 0.55 - 1.02 mg/dL    BUN/Creatinine ratio 21 (H) 12 - 20      GFR est AA 50 (L) >60 ml/min/1.73m2    GFR est non-AA 41 (L) >60 ml/min/1.73m2    Calcium 8.8 8.5 - 10.1 mg/dL    Bilirubin, total 0.3 0.2 - 1.0 mg/dL    AST (SGOT) 11 (L) 15 - 37 U/L    ALT (SGPT) 9 (L) 12 - 78 U/L    Alk. phosphatase 59 45 - 117 U/L    Protein, total 6.8 6.4 - 8.2 g/dL    Albumin 2.8 (L) 3.5 - 5.0 g/dL    Globulin 4.0 2.0 - 4.0 g/dL    A-G Ratio 0.7 (L) 1.1 - 2.2         Radiologic Studies -   [unfilled]  CT Results  (Last 48 hours)               12/22/21 8952  CT CODE NEURO HEAD WO CONTRAST Final result    Impression:  No acute intracranial process. Other findings as above. Findings conveyed to Dr. Shania Cazares on 12/22/2021 at 4:56 PM.               Narrative:  CT head, 12/22/2021       History: Stroke. Technique: No intravenous contrast was administered. Multiple contiguous axial   images were acquired from the vertex to the skull base. Coronal and sagittal   reconstruction was made.        All CT scans at this facility are performed using dose reduction optimization   techniques as appropriate to perform the exam including the following: Automated   exposure control, adjustments of the mA and/or kV according to patient size, or   use iterative reconstruction technique. Comparison: None. Findings: Right parietal craniotomy is noted. Cortical and cerebellar volume   loss and associated ventricular system dilatation are noted. There is   encephalomalacia in the right parietal, occipital and temporal lobes with   associated ex vacuo dilatation of the right posterior and temporal horns. Periventricular and subcortical low attenuation is suggestive of small vessel   ischemic disease. Gray-white differentiation is preserved. No acute intracranial   hemorrhage or midline shift is identified. The calvarium is intact. The orbits and globes are intact. There is minimal opacification of the   sphenoid sinus without acute sinusitis. The remainder of the visualized   paranasal sinuses and mastoid air cells are clear. CXR Results  (Last 48 hours)    None          Medical Decision Making and ED Course   - I am the first and primary provider for this patient AND AM THE PRIMARY PROVIDER OF RECORD. - I reviewed the vital signs, available nursing notes, past medical history, past surgical history, family history and social history. - Initial assessment performed. The patients presenting problems have been discussed, and the staff are in agreement with the care plan formulated and outlined with them. I have encouraged them to ask questions as they arise throughout their visit. Vital Signs-Reviewed the patient's vital signs.     Patient Vitals for the past 12 hrs:   Temp Pulse Resp BP SpO2   12/22/21 1943    108/81    12/22/21 1827  65 17 (!) 177/102 99 %   12/22/21 1621 97.6 °F (36.4 °C) 63  (!) 184/102 97 %   12/22/21 1428    (!) 197/124 99 %   12/22/21 1356 99.3 °F (37.4 °C) 67 20 (!) 250/122        Records Reviewed: Nursing Notes        ED Course:       ED Course as of 12/22/21 2127   Wed Dec 22, 2021   169 40-year-old female presents for evaluation of a seizure. Patient has a history of a old CVA but deficits are unknown. Here patient is unable to tell if her facial droop on the left is old or new. I attempted to contact family as well but was unable to get in touch with anyone so stroke alert was called presuming that this is a new facial droop. Neurologic exam also significant for weakness on the left. Patient does carry a diagnosis of seizure and is on Keppra for this. Will give a loading dose here. Patient's blood pressure initially very elevated at 250/122. She endorses compliance with medications. Differential diagnosis includes seizure versus hypertensive emergency versus ICH versus CVA. Giving level and head CT as well as labs including a CBC, CMP and UA. Disposition as per clinical course. [LW]   1721 Chart review patient is on clonidine. We will give her home dose here. [LW]   [address] Dr. Sanju Simons accepts admission. Spoke to patient's son. Facial droop and left-sided weakness is baseline. Patient admitted for hypertensive emergency in the setting of hypertension and seizures. Head CT is unremarkable. Patient will be admitted for monitoring. Blood pressure has improved after 1 dose of clonidine so continue to observe. [LW]      ED Course User Index  [LW] Ez Erwin MD         Consultations:       Consultations: -  Hospitalist Consultant: Dr. Sanju Simons: We have asked for emergent assistance with regard to this patient. We have discussed the patients HPI, ROS, PE and results this far. They will come and evaluate the patient for admission. Disposition     Disposition: Admitted to Floor Medical Floor the case was discussed with the admitting physician Sanju Simons. Admitted      Diagnosis     Clinical Impression:   1. Hypertensive emergency    2.  Seizure [facility])        Attestations:    Sabrina Grant MD    Please note that this dictation was completed with Dragon, the computer voice recognition software. Quite often unanticipated grammatical, syntax, homophones, and other interpretive errors are inadvertently transcribed by the computer software. Please disregard these errors. Please excuse any errors that have escaped final proofreading. Thank you.

## 2021-12-23 ENCOUNTER — APPOINTMENT (OUTPATIENT)
Dept: MRI IMAGING | Age: 66
DRG: 100 | End: 2021-12-23
Attending: PSYCHIATRY & NEUROLOGY
Payer: MEDICARE

## 2021-12-23 LAB
ALBUMIN SERPL-MCNC: 2.7 G/DL (ref 3.5–5)
ALBUMIN/GLOB SERPL: 0.8 {RATIO} (ref 1.1–2.2)
ALP SERPL-CCNC: 40 U/L (ref 45–117)
ALT SERPL-CCNC: 11 U/L (ref 12–78)
ANION GAP SERPL CALC-SCNC: 5 MMOL/L (ref 5–15)
AST SERPL W P-5'-P-CCNC: 8 U/L (ref 15–37)
AST SERPL W P-5'-P-CCNC: ABNORMAL U/L (ref 15–37)
ATRIAL RATE: 67 BPM
BILIRUB SERPL-MCNC: 0.3 MG/DL (ref 0.2–1)
BUN SERPL-MCNC: 26 MG/DL (ref 6–20)
BUN/CREAT SERPL: 27 (ref 12–20)
CA-I BLD-MCNC: 8.1 MG/DL (ref 8.5–10.1)
CALCULATED P AXIS, ECG09: 66 DEGREES
CALCULATED R AXIS, ECG10: 70 DEGREES
CALCULATED T AXIS, ECG11: 88 DEGREES
CHLORIDE SERPL-SCNC: 108 MMOL/L (ref 97–108)
CK SERPL-CCNC: 61 U/L (ref 26–192)
CO2 SERPL-SCNC: 26 MMOL/L (ref 21–32)
CREAT SERPL-MCNC: 0.96 MG/DL (ref 0.55–1.02)
DIAGNOSIS, 93000: NORMAL
GLOBULIN SER CALC-MCNC: 3.5 G/DL (ref 2–4)
GLUCOSE SERPL-MCNC: 85 MG/DL (ref 65–100)
P-R INTERVAL, ECG05: 142 MS
POTASSIUM SERPL-SCNC: 3.8 MMOL/L (ref 3.5–5.1)
POTASSIUM SERPL-SCNC: ABNORMAL MMOL/L (ref 3.5–5.1)
PROT SERPL-MCNC: 6.2 G/DL (ref 6.4–8.2)
Q-T INTERVAL, ECG07: 436 MS
QRS DURATION, ECG06: 82 MS
QTC CALCULATION (BEZET), ECG08: 460 MS
SODIUM SERPL-SCNC: 139 MMOL/L (ref 136–145)
TSH SERPL DL<=0.05 MIU/L-ACNC: 0.77 UIU/ML (ref 0.36–3.74)
VENTRICULAR RATE, ECG03: 67 BPM

## 2021-12-23 PROCEDURE — 84443 ASSAY THYROID STIM HORMONE: CPT

## 2021-12-23 PROCEDURE — 74011250636 HC RX REV CODE- 250/636: Performed by: INTERNAL MEDICINE

## 2021-12-23 PROCEDURE — 36415 COLL VENOUS BLD VENIPUNCTURE: CPT

## 2021-12-23 PROCEDURE — 80177 DRUG SCRN QUAN LEVETIRACETAM: CPT

## 2021-12-23 PROCEDURE — 84450 TRANSFERASE (AST) (SGOT): CPT

## 2021-12-23 PROCEDURE — 70551 MRI BRAIN STEM W/O DYE: CPT

## 2021-12-23 PROCEDURE — 65270000029 HC RM PRIVATE

## 2021-12-23 PROCEDURE — 82550 ASSAY OF CK (CPK): CPT

## 2021-12-23 PROCEDURE — 84146 ASSAY OF PROLACTIN: CPT

## 2021-12-23 PROCEDURE — 74011250637 HC RX REV CODE- 250/637: Performed by: INTERNAL MEDICINE

## 2021-12-23 PROCEDURE — 84132 ASSAY OF SERUM POTASSIUM: CPT

## 2021-12-23 RX ORDER — CLONIDINE HYDROCHLORIDE 0.2 MG/1
0.2 TABLET ORAL 2 TIMES DAILY
Status: DISCONTINUED | OUTPATIENT
Start: 2021-12-23 | End: 2022-01-07 | Stop reason: HOSPADM

## 2021-12-23 RX ORDER — AMLODIPINE BESYLATE 5 MG/1
5 TABLET ORAL 2 TIMES DAILY
Status: DISCONTINUED | OUTPATIENT
Start: 2021-12-23 | End: 2022-01-07 | Stop reason: HOSPADM

## 2021-12-23 RX ORDER — LABETALOL 100 MG/1
TABLET, FILM COATED ORAL 2 TIMES DAILY
COMMUNITY
End: 2022-01-07

## 2021-12-23 RX ORDER — HYDRALAZINE HYDROCHLORIDE 20 MG/ML
10 INJECTION INTRAMUSCULAR; INTRAVENOUS
Status: DISCONTINUED | OUTPATIENT
Start: 2021-12-23 | End: 2022-01-07 | Stop reason: HOSPADM

## 2021-12-23 RX ORDER — DIVALPROEX SODIUM 500 MG/1
500 TABLET, DELAYED RELEASE ORAL 2 TIMES DAILY
COMMUNITY
End: 2022-01-07

## 2021-12-23 RX ORDER — LEVETIRACETAM 5 MG/ML
500 INJECTION INTRAVASCULAR EVERY 12 HOURS
Status: DISCONTINUED | OUTPATIENT
Start: 2021-12-23 | End: 2021-12-26

## 2021-12-23 RX ORDER — HYDRALAZINE HYDROCHLORIDE 100 MG/1
100 TABLET, FILM COATED ORAL 4 TIMES DAILY
COMMUNITY

## 2021-12-23 RX ORDER — LEVETIRACETAM 500 MG/1
500 TABLET ORAL 2 TIMES DAILY
COMMUNITY

## 2021-12-23 RX ORDER — AMLODIPINE BESYLATE 5 MG/1
5 TABLET ORAL DAILY
Status: DISCONTINUED | OUTPATIENT
Start: 2021-12-23 | End: 2021-12-23

## 2021-12-23 RX ORDER — ENOXAPARIN SODIUM 100 MG/ML
40 INJECTION SUBCUTANEOUS EVERY 24 HOURS
Status: DISCONTINUED | OUTPATIENT
Start: 2021-12-23 | End: 2022-01-07 | Stop reason: HOSPADM

## 2021-12-23 RX ADMIN — CLONIDINE HYDROCHLORIDE 0.2 MG: 0.2 TABLET ORAL at 08:37

## 2021-12-23 RX ADMIN — AMLODIPINE BESYLATE 5 MG: 5 TABLET ORAL at 21:21

## 2021-12-23 RX ADMIN — HYDRALAZINE HYDROCHLORIDE 10 MG: 20 INJECTION INTRAMUSCULAR; INTRAVENOUS at 17:15

## 2021-12-23 RX ADMIN — SODIUM CHLORIDE, PRESERVATIVE FREE 10 ML: 5 INJECTION INTRAVENOUS at 17:15

## 2021-12-23 RX ADMIN — LEVETIRACETAM 500 MG: 5 INJECTION INTRAVENOUS at 21:21

## 2021-12-23 RX ADMIN — LEVETIRACETAM 500 MG: 5 INJECTION INTRAVENOUS at 08:38

## 2021-12-23 RX ADMIN — AMLODIPINE BESYLATE 5 MG: 5 TABLET ORAL at 08:37

## 2021-12-23 RX ADMIN — OXYCODONE 5 MG: 5 TABLET ORAL at 21:21

## 2021-12-23 RX ADMIN — CLONIDINE HYDROCHLORIDE 0.2 MG: 0.2 TABLET ORAL at 21:21

## 2021-12-23 RX ADMIN — ENOXAPARIN SODIUM 40 MG: 100 INJECTION SUBCUTANEOUS at 08:38

## 2021-12-23 RX ADMIN — SODIUM CHLORIDE, PRESERVATIVE FREE 10 ML: 5 INJECTION INTRAVENOUS at 21:27

## 2021-12-23 RX ADMIN — OXYCODONE 5 MG: 5 TABLET ORAL at 08:50

## 2021-12-23 NOTE — H&P
History and Physical    Patient: Refugio Purvis MRN: [id number]  SSN: [id number]    YOB: 1955  Age: 78 y.o. Sex: female      Subjective:      Refugio Purvis is a 78 y.o. female who was brought to the hospital because of seizure event and elevated blood pressure. Patient is unable to give history she has no recollection of the events. She is a poor historian    Past Medical History:   Diagnosis Date    Hypertension      No past surgical history on file. No family history on file. Social History     Tobacco Use    Smoking status: Former Smoker    Smokeless tobacco: Never Used   Substance Use Topics    Alcohol use: Yes     Alcohol/week: 56.0 standard drinks     Types: 56 Cans of beer per week      Prior to Admission medications    Medication Sig Start Date End Date Taking? Authorizing Provider   hydrALAZINE (APRESOLINE) 100 mg tablet Take 100 mg by mouth four (4) times daily. Yes Provider, Historical   levETIRAcetam (Keppra) 500 mg tablet Take 500 mg by mouth two (2) times a day. Yes Provider, Historical   divalproex DR (Depakote) 500 mg tablet Take 500 mg by mouth two (2) times a day. Yes Provider, Historical   labetaloL (NORMODYNE) 100 mg tablet Take  by mouth two (2) times a day. Yes Provider, Historical   amLODIPine (NORVASC) 5 mg tablet Take 5 mg by mouth daily. Other, MD Colton   cloNIDine HCl (CATAPRES) 0.2 mg tablet Take 0.2 mg by mouth two (2) times a day. Colton Almonte MD   levoFLOXacin (LEVAQUIN) 750 mg tablet Take 750 mg by mouth daily. Colton Almonte MD        No Known Allergies    Review of Systems:  A comprehensive review of systems was negative except for that written in the History of Present Illness.     Objective:     Vitals:    12/23/21 0445 12/23/21 0555 12/23/21 0720 12/23/21 0803   BP: (!) 134/92 (!) 144/92 (!) 152/104 (!) 185/113   Pulse: (!) 56 (!) 59 60 (!) 59   Resp: 16  20 18   Temp:   98.6 °F (37 °C) 98.6 °F (37 °C)   SpO2: 99% 99% 98% 97% Weight:       Height:            Physical Exam:  General:  Alert, drowsy but responsive to verbal commands. Eyes:  Conjunctivae/corneas clear. PERRL, EOMs intact. Fundi benign   Ears:  Normal TMs and external ear canals both ears. Nose: Nares normal. Septum midline. Mucosa normal. No drainage or sinus tenderness. Mouth/Throat: Lips, mucosa, and tongue normal. Teeth and gums normal.   Neck: Supple, symmetrical, trachea midline, no adenopathy, thyroid: no enlargment/tenderness/nodules, no carotid bruit and no JVD. Back:   Symmetric, no curvature. ROM normal. No CVA tenderness. Lungs:   Clear to auscultation bilaterally. Heart:  Regular rate and rhythm, S1, S2 normal, no murmur, click, rub or gallop. Abdomen:   Soft, non-tender. Bowel sounds normal. No masses,  No organomegaly. Extremities: Extremities normal, atraumatic, no cyanosis or edema. Pulses: 2+ and symmetric all extremities. Skin: Skin color, texture, turgor normal. No rashes or lesions   Lymph nodes: Cervical, supraclavicular, and axillary nodes normal.   Neurologic: CNII-XII intact. Weakness.        Assessment:     Hospital Problems  Never Reviewed          Codes Class Noted POA    Hypertensive emergency ICD-10-CM: I16.1  ICD-9-CM: 401.9  12/22/2021 Unknown          new onset onset seizure  Altered mental status with encephalopathy    Plan:     Obtain EEG consult neurology    Signed By: Thaddeus Lebron MD     December 23, 2021

## 2021-12-23 NOTE — PROGRESS NOTES
Reason for Admission:  Hypertensive Emergency                     RUR Score:   12%                  Plan for utilizing home health: SNF requested         PCP: First and Last name:  Dr. Amador Chen     Name of Practice:    Are you a current patient: Yes/No:    Approximate date of last visit: Nov 2021   Can you participate in a virtual visit with your PCP:                     Current Advanced Directive/Advance Care Plan: Full Code      Healthcare Decision Maker:   Click here to complete [address] including selection of the [address] Relationship (ie \"Primary\")           Rosita Jacob, [phone]                  Transition of Care Plan:                    Patient currently lives at home with her son at [address] [place] [address], [place] [address], [address]. Patient has a walker and wheelchair at home. Patient's son states that he feels she needs rehab before returning home. He has [facility] for her in the past and she is not motivated enough at home to keep up with exercises when therapists are not at the house. CM has obtained PT/OT orders and explained that CM will follow up once PT/OT has given recommendations. Current Dispo: SNF.

## 2021-12-23 NOTE — ROUTINE PROCESS
TRANSFER - OUT REPORT:    Verbal report given to sabrina  on [facility]  being transferred to [facility] or routine progression of care       Report consisted of patients Situation, Background, Assessment and   Recommendations(SBAR). Information from the following report(s) SBAR was reviewed with the receiving nurse. Lines:   Peripheral IV 12/22/21 Anterior;Distal;Right Forearm (Active)   Site Assessment Clean, dry, & intact 12/22/21 1357   Phlebitis Assessment 0 12/22/21 1357   Dressing Status Clean, dry, & intact 12/22/21 1357        Opportunity for questions and clarification was provided.       Patient transported with:   [employer]

## 2021-12-24 PROCEDURE — 74011250636 HC RX REV CODE- 250/636: Performed by: INTERNAL MEDICINE

## 2021-12-24 PROCEDURE — 74011250637 HC RX REV CODE- 250/637: Performed by: INTERNAL MEDICINE

## 2021-12-24 PROCEDURE — 65270000029 HC RM PRIVATE

## 2021-12-24 RX ADMIN — ENOXAPARIN SODIUM 40 MG: 100 INJECTION SUBCUTANEOUS at 08:23

## 2021-12-24 RX ADMIN — AMLODIPINE BESYLATE 5 MG: 5 TABLET ORAL at 08:23

## 2021-12-24 RX ADMIN — OXYCODONE 5 MG: 5 TABLET ORAL at 12:28

## 2021-12-24 RX ADMIN — LEVETIRACETAM 500 MG: 5 INJECTION INTRAVENOUS at 08:23

## 2021-12-24 RX ADMIN — BISACODYL 5 MG: 5 TABLET, COATED ORAL at 12:28

## 2021-12-24 RX ADMIN — CLONIDINE HYDROCHLORIDE 0.2 MG: 0.2 TABLET ORAL at 08:23

## 2021-12-24 RX ADMIN — LEVETIRACETAM 500 MG: 5 INJECTION INTRAVENOUS at 21:49

## 2021-12-24 RX ADMIN — CLONIDINE HYDROCHLORIDE 0.2 MG: 0.2 TABLET ORAL at 21:49

## 2021-12-24 RX ADMIN — SODIUM CHLORIDE, PRESERVATIVE FREE 10 ML: 5 INJECTION INTRAVENOUS at 05:34

## 2021-12-24 RX ADMIN — AMLODIPINE BESYLATE 5 MG: 5 TABLET ORAL at 21:49

## 2021-12-24 NOTE — PROGRESS NOTES
Problem: Pressure Injury - Risk of  Goal: *Prevention of pressure injury  Description: Document Baldo Scale and appropriate interventions in the flowsheet. Outcome: Progressing Towards Goal  Note: Pressure Injury Interventions:  Sensory Interventions: Assess changes in LOC,Keep linens dry and wrinkle-free,Minimize linen layers,Monitor skin under medical devices,Turn and reposition approx. every two hours (pillows and wedges if needed)    Moisture Interventions: Absorbent underpads,Apply protective barrier, creams and emollients,Minimize layers,Moisture barrier    Activity Interventions: PT/OT evaluation    Mobility Interventions: PT/OT evaluation,Turn and reposition approx. every two hours(pillow and wedges)    Nutrition Interventions: Offer support with meals,snacks and hydration    Friction and Shear Interventions: Minimize layers,Transferring/repositioning devices                Problem: Patient Education: Go to Patient Education Activity  Goal: Patient/Family Education  Outcome: Progressing Towards Goal     Problem: Falls - Risk of  Goal: *Absence of Falls  Description: Document [place] Fall Risk and appropriate interventions in the flowsheet.   Outcome: Progressing Towards Goal  Note: Fall Risk Interventions:       Mentation Interventions: Bed/chair exit alarm,Increase mobility,Reorient patient         Elimination Interventions: Bed/chair exit alarm,Call light in reach              Problem: Patient Education: Go to Patient Education Activity  Goal: Patient/Family Education  Outcome: Progressing Towards Goal

## 2021-12-24 NOTE — PROGRESS NOTES
Heel boots placed on patient Bilateral heels and patient turned on side with wedge.  Patient tolerated well

## 2021-12-24 NOTE — PROGRESS NOTES
Progress Note    Patient: Manuel Monroe MRN: [id number]  SSN: [id number]    YOB: 1955  Age: 78 y.o. Sex: female      Admit Date: 12/22/2021    LOS: 2 days     Subjective:     78years old lady possible new onset seizures with metabolic encephalopathy    Objective:     Vitals:    12/24/21 0708 12/24/21 0800 12/24/21 1200 12/24/21 1231   BP: (!) 152/110   122/78   Pulse: (!) 59 65 64 64   Resp: 16      Temp: (!) 94.7 °F (34.8 °C)      SpO2: 99%      Weight:       Height:            Intake and Output:  Current Shift: 12/24 0701 - 12/24 1900  In: 400 [P.O.:400]  Out: -   Last three shifts: 12/22 1901 - 12/24 0700  In: 300 [P.O.:300]  Out: 300 [Urine:300]    Physical Exam:   General:  Alert, cooperative, no distress, appears stated age. Eyes:  Conjunctivae/corneas clear. PERRL, EOMs intact. Fundi benign   Ears:  Normal TMs and external ear canals both ears. Nose: Nares normal. Septum midline. Mucosa normal. No drainage or sinus tenderness. Mouth/Throat: Lips, mucosa, and tongue normal. Teeth and gums normal.   Neck: Supple, symmetrical, trachea midline, no adenopathy, thyroid: no enlargment/tenderness/nodules, no carotid bruit and no JVD. Back:   Symmetric, no curvature. ROM normal. No CVA tenderness. Lungs:   Clear to auscultation bilaterally. Heart:  Regular rate and rhythm, S1, S2 normal, no murmur, click, rub or gallop. Abdomen:   Soft, non-tender. Bowel sounds normal. No masses,  No organomegaly. Extremities: Extremities normal, atraumatic, no cyanosis or edema. Pulses: 2+ and symmetric all extremities. Skin: Skin color, texture, turgor normal. No rashes or lesions   Lymph nodes: Cervical, supraclavicular, and axillary nodes normal.   Neurologic: CNII-XII intact. Normal strength, sensation and reflexes throughout. Lab/Data Review: All lab results for the last 24 hours reviewed.      No results found for this or any previous visit (from the past 24 hour(s)).       Assessment:     Active Problems:    Hypertensive emergency (12/22/2021)    New onset seizure  Bradycardia    Plan:     Continue present work-up adjust medication for better blood pressure control    Signed By: Eliceo Figueroa MD     December 24, 2021

## 2021-12-25 LAB — PROLACTIN SERPL-MCNC: 12 NG/ML

## 2021-12-25 PROCEDURE — 65270000029 HC RM PRIVATE

## 2021-12-25 PROCEDURE — 74011250636 HC RX REV CODE- 250/636: Performed by: INTERNAL MEDICINE

## 2021-12-25 PROCEDURE — 74011250637 HC RX REV CODE- 250/637: Performed by: INTERNAL MEDICINE

## 2021-12-25 RX ADMIN — LEVETIRACETAM 500 MG: 5 INJECTION INTRAVENOUS at 08:19

## 2021-12-25 RX ADMIN — AMLODIPINE BESYLATE 5 MG: 5 TABLET ORAL at 20:20

## 2021-12-25 RX ADMIN — CLONIDINE HYDROCHLORIDE 0.2 MG: 0.2 TABLET ORAL at 20:20

## 2021-12-25 RX ADMIN — LEVETIRACETAM 500 MG: 5 INJECTION INTRAVENOUS at 20:20

## 2021-12-25 RX ADMIN — CLONIDINE HYDROCHLORIDE 0.2 MG: 0.2 TABLET ORAL at 08:40

## 2021-12-25 RX ADMIN — OXYCODONE 5 MG: 5 TABLET ORAL at 20:20

## 2021-12-25 RX ADMIN — ENOXAPARIN SODIUM 40 MG: 100 INJECTION SUBCUTANEOUS at 08:40

## 2021-12-25 RX ADMIN — SODIUM CHLORIDE, PRESERVATIVE FREE 10 ML: 5 INJECTION INTRAVENOUS at 02:45

## 2021-12-25 RX ADMIN — SODIUM CHLORIDE, PRESERVATIVE FREE 10 ML: 5 INJECTION INTRAVENOUS at 14:00

## 2021-12-25 RX ADMIN — AMLODIPINE BESYLATE 5 MG: 5 TABLET ORAL at 08:40

## 2021-12-25 RX ADMIN — SODIUM CHLORIDE, PRESERVATIVE FREE 10 ML: 5 INJECTION INTRAVENOUS at 08:23

## 2021-12-25 RX ADMIN — HYDRALAZINE HYDROCHLORIDE 10 MG: 20 INJECTION INTRAMUSCULAR; INTRAVENOUS at 18:25

## 2021-12-25 NOTE — PROGRESS NOTES
Progress Note    Patient: Manuel Monroe MRN: [id number]  SSN: [id number]    YOB: 1955  Age: 78 y.o. Sex: female      Admit Date: 12/22/2021    LOS: 3 days     Subjective:     78years old lady possible new onset seizures with metabolic encephalopathy    Objective:     Vitals:    12/24/21 1231 12/24/21 1737 12/24/21 2018 12/25/21 0840   BP: 122/78 126/82 121/80 (!) 133/99   Pulse: 64 (!) 58 60 62   Resp:  16 16 20   Temp:  97.4 °F (36.3 °C) 98 °F (36.7 °C) 98 °F (36.7 °C)   SpO2:  99% 98%    Weight:       Height:            Intake and Output:  Current Shift: No intake/output data recorded. Last three shifts: 12/23 1901 - 12/25 0700  In: 400 [P.O.:400]  Out: 900 [Urine:900]    Physical Exam:   General:  Alert, cooperative, no distress, appears stated age. Eyes:  Conjunctivae/corneas clear. PERRL, EOMs intact. Fundi benign   Ears:  Normal TMs and external ear canals both ears. Nose: Nares normal. Septum midline. Mucosa normal. No drainage or sinus tenderness. Mouth/Throat: Lips, mucosa, and tongue normal. Teeth and gums normal.   Neck: Supple, symmetrical, trachea midline, no adenopathy, thyroid: no enlargment/tenderness/nodules, no carotid bruit and no JVD. Back:   Symmetric, no curvature. ROM normal. No CVA tenderness. Lungs:   Clear to auscultation bilaterally. Heart:  Regular rate and rhythm, S1, S2 normal, no murmur, click, rub or gallop. Abdomen:   Soft, non-tender. Bowel sounds normal. No masses,  No organomegaly. Extremities: Extremities normal, atraumatic, no cyanosis or edema. Pulses: 2+ and symmetric all extremities. Skin: Skin color, texture, turgor normal. No rashes or lesions   Lymph nodes: Cervical, supraclavicular, and axillary nodes normal.   Neurologic: CNII-XII intact. Normal strength, sensation and reflexes throughout. Lab/Data Review: All lab results for the last 24 hours reviewed.      No results found for this or any previous visit (from the past 24 hour(s)).       Assessment:     Active Problems:    Hypertensive emergency (12/22/2021)    New onset seizure  Bradycardia    Plan:     Continue present work-up adjust medication for better blood pressure control    Repeat the labs in the morning    PT OT consult    Signed By: Palma Yates MD     December 25, 2021

## 2021-12-26 LAB
ALBUMIN SERPL-MCNC: 2.9 G/DL (ref 3.5–5)
ALBUMIN/GLOB SERPL: 0.7 {RATIO} (ref 1.1–2.2)
ALP SERPL-CCNC: 43 U/L (ref 45–117)
ALT SERPL-CCNC: 8 U/L (ref 12–78)
ANION GAP SERPL CALC-SCNC: 6 MMOL/L (ref 5–15)
AST SERPL W P-5'-P-CCNC: 9 U/L (ref 15–37)
BILIRUB SERPL-MCNC: 0.3 MG/DL (ref 0.2–1)
BUN SERPL-MCNC: 25 MG/DL (ref 6–20)
BUN/CREAT SERPL: 33 (ref 12–20)
CA-I BLD-MCNC: 9.4 MG/DL (ref 8.5–10.1)
CHLORIDE SERPL-SCNC: 109 MMOL/L (ref 97–108)
CO2 SERPL-SCNC: 27 MMOL/L (ref 21–32)
CREAT SERPL-MCNC: 0.76 MG/DL (ref 0.55–1.02)
ERYTHROCYTE [DISTWIDTH] IN BLOOD BY AUTOMATED COUNT: 13.9 % (ref 11.5–14.5)
GLOBULIN SER CALC-MCNC: 4.3 G/DL (ref 2–4)
GLUCOSE SERPL-MCNC: 89 MG/DL (ref 65–100)
HCT VFR BLD AUTO: 30.5 % (ref 35–47)
HGB BLD-MCNC: 9.9 G/DL (ref 11.5–16)
MCH RBC QN AUTO: 33.6 PG (ref 26–34)
MCHC RBC AUTO-ENTMCNC: 32.5 G/DL (ref 30–36.5)
MCV RBC AUTO: 103.4 FL (ref 80–99)
NRBC # BLD: 0 K/UL (ref 0–0.01)
NRBC BLD-RTO: 0 PER 100 WBC
PLATELET # BLD AUTO: 134 K/UL (ref 150–400)
PMV BLD AUTO: 11.9 FL (ref 8.9–12.9)
POTASSIUM SERPL-SCNC: 3.8 MMOL/L (ref 3.5–5.1)
PROT SERPL-MCNC: 7.2 G/DL (ref 6.4–8.2)
RBC # BLD AUTO: 2.95 M/UL (ref 3.8–5.2)
SODIUM SERPL-SCNC: 142 MMOL/L (ref 136–145)
WBC # BLD AUTO: 6.6 K/UL (ref 3.6–11)

## 2021-12-26 PROCEDURE — 97530 THERAPEUTIC ACTIVITIES: CPT

## 2021-12-26 PROCEDURE — 97165 OT EVAL LOW COMPLEX 30 MIN: CPT

## 2021-12-26 PROCEDURE — 65270000029 HC RM PRIVATE

## 2021-12-26 PROCEDURE — 74011250637 HC RX REV CODE- 250/637: Performed by: FAMILY MEDICINE

## 2021-12-26 PROCEDURE — 85027 COMPLETE CBC AUTOMATED: CPT

## 2021-12-26 PROCEDURE — 74011250637 HC RX REV CODE- 250/637: Performed by: INTERNAL MEDICINE

## 2021-12-26 PROCEDURE — 97161 PT EVAL LOW COMPLEX 20 MIN: CPT

## 2021-12-26 PROCEDURE — 80053 COMPREHEN METABOLIC PANEL: CPT

## 2021-12-26 PROCEDURE — 36415 COLL VENOUS BLD VENIPUNCTURE: CPT

## 2021-12-26 PROCEDURE — 74011250636 HC RX REV CODE- 250/636: Performed by: INTERNAL MEDICINE

## 2021-12-26 RX ORDER — LEVETIRACETAM 500 MG/1
500 TABLET ORAL 2 TIMES DAILY
Status: DISCONTINUED | OUTPATIENT
Start: 2021-12-26 | End: 2022-01-07 | Stop reason: HOSPADM

## 2021-12-26 RX ADMIN — SODIUM CHLORIDE, PRESERVATIVE FREE 10 ML: 5 INJECTION INTRAVENOUS at 19:52

## 2021-12-26 RX ADMIN — LEVETIRACETAM 500 MG: 5 INJECTION INTRAVENOUS at 08:59

## 2021-12-26 RX ADMIN — LEVETIRACETAM 500 MG: 500 TABLET, FILM COATED ORAL at 19:51

## 2021-12-26 RX ADMIN — OXYCODONE 5 MG: 5 TABLET ORAL at 19:50

## 2021-12-26 RX ADMIN — SODIUM CHLORIDE, PRESERVATIVE FREE 10 ML: 5 INJECTION INTRAVENOUS at 06:06

## 2021-12-26 RX ADMIN — SODIUM CHLORIDE, PRESERVATIVE FREE 10 ML: 5 INJECTION INTRAVENOUS at 16:34

## 2021-12-26 RX ADMIN — AMLODIPINE BESYLATE 5 MG: 5 TABLET ORAL at 19:51

## 2021-12-26 RX ADMIN — CLONIDINE HYDROCHLORIDE 0.2 MG: 0.2 TABLET ORAL at 08:59

## 2021-12-26 RX ADMIN — ENOXAPARIN SODIUM 40 MG: 100 INJECTION SUBCUTANEOUS at 08:59

## 2021-12-26 RX ADMIN — CLONIDINE HYDROCHLORIDE 0.2 MG: 0.2 TABLET ORAL at 19:51

## 2021-12-26 RX ADMIN — AMLODIPINE BESYLATE 5 MG: 5 TABLET ORAL at 08:59

## 2021-12-26 NOTE — PROGRESS NOTES
Progress Note    Patient: Ryan Montenegro MRN: [id number]  SSN: [id number]    YOB: 1955  Age: 78 y.o. Sex: female      Admit Date: 12/22/2021    LOS: 4 days     Subjective:     78years old lady possible new onset seizures with metabolic encephalopathy    Patient is alert awake not in any distress    Objective:     Vitals:    12/25/21 0840 12/25/21 1810 12/25/21 1911 12/26/21 0709   BP: (!) 133/99 (!) 171/105 (!) 176/98 (!) 171/108   Pulse: 62  68 (!) 58   Resp: 20 18 16 16   Temp: 98 °F (36.7 °C) 98.5 °F (36.9 °C) 97.9 °F (36.6 °C) 98.2 °F (36.8 °C)   SpO2:  98% 97% 100%   Weight:       Height:            Intake and Output:  Current Shift: No intake/output data recorded. Last three shifts: 12/24 1901 - 12/26 0700  In: 76 [P.O.:75]  Out: 1500 [Urine:1500]    Physical Exam:   General:  Alert, cooperative, no distress, appears stated age. Eyes:  Conjunctivae/corneas clear. PERRL, EOMs intact. Fundi benign   Ears:  Normal TMs and external ear canals both ears. Nose: Nares normal. Septum midline. Mucosa normal. No drainage or sinus tenderness. Mouth/Throat: Lips, mucosa, and tongue normal. Teeth and gums normal.   Neck: Supple, symmetrical, trachea midline, no adenopathy, thyroid: no enlargment/tenderness/nodules, no carotid bruit and no JVD. Back:   Symmetric, no curvature. ROM normal. No CVA tenderness. Lungs:   Clear to auscultation bilaterally. Heart:  Regular rate and rhythm, S1, S2 normal, no murmur, click, rub or gallop. Abdomen:   Soft, non-tender. Bowel sounds normal. No masses,  No organomegaly. Extremities: Extremities normal, atraumatic, no cyanosis or edema. Pulses: 2+ and symmetric all extremities. Skin: Skin color, texture, turgor normal. No rashes or lesions   Lymph nodes: Cervical, supraclavicular, and axillary nodes normal.   Neurologic: CNII-XII intact. Normal strength, sensation and reflexes throughout. Lab/Data Review:   All lab results for the last 24 hours reviewed. No results found for this or any previous visit (from the past 24 hour(s)).       Assessment:     Active Problems:    Hypertensive emergency (12/22/2021)    New onset seizure  Bradycardia  Thrombocytopenia    Plan:     Continue present work-up adjust medication for better blood pressure control    On amlodipine 5 mg twice a day  Clonidine 0.2 mg twice a day      Keppra 500 mg twice daily    Repeat the labs in the morning    PT OT consult    Discharge planning    Signed By: Ivan Carrera MD     December 26, 2021

## 2021-12-26 NOTE — PROGRESS NOTES
PHYSICAL THERAPY EVALUATION  Patient: Manuel Monroe (83 y.o. female)  Date: 12/26/2021  Primary Diagnosis: Hypertensive emergency [I16.1]        Precautions: fall      ASSESSMENT  78years old lady possible new onset seizures with metabolic encephalopathy. Pt A&O x 1. Per chart  pt resides with son in a 1 level home -other information unable to acquire due to  impaired patient's cognitivity. As per chart and CM notes,patient's son wants he to go to rehab. Based on the objective data described below, the patient presents with generalized weakness, impaired functional mobility, impaired amb, impaired balance, and endurance. Pt semi supine (and nursing still in middle of feeding her but patient unable to use UE to feed self) upon PT arrival, agreeable to evaluation. Pt required mod-max Ax 2  for (bed mobility,  supine <> sit,) transfers. unable to sit at eob w/o support. Pt did fair with session today with therapy. patient LUE and LLE weaker but does not move any extremities   Pt will benefit from continued skilled PT to address above deficits and return to PLOF. Current PT DC recommendation SNF to LTC due to above deficits. ( according to the session assessment patient does not appear to be doing at lot at home. She reported she does not walk and appears to be bed bound)    Current Level of Function Impacting Discharge (mobility/balance): patient requiring lot of assist for all mobility and poor to fair sitting balance. Other factors to consider for discharge: will need hosp bed , wheelchair, mech. lift     PLAN :  Recommendations and Planned Interventions: bed mobility training, transfer training, gait training, therapeutic exercises, patient and family training/education, and therapeutic activities      Recommend with staff: bed bound    Frequency/Duration: Patient will be followed by physical therapy:  2-3x/week to address goals.     Recommendation for discharge: (in order for the patient to meet his/her long term goals)  Hilton Fountain    This discharge recommendation:  Has been made in collaboration with the attending provider and/or case management    IF patient discharges home will need the following DME: to be determined (TBD)         SUBJECTIVE:   Patient stated i want my eggs.     OBJECTIVE DATA SUMMARY:   HISTORY:    Past Medical History:   Diagnosis Date    Hypertension    No past surgical history on file. Home Situation  Home Environment: Private residence  Living Alone: No  Support Systems: Child(agustín)  Current DME Used/Available at Home: [employer]    EXAMINATION/PRESENTATION/DECISION MAKING:   Critical Behavior:  Neurologic State: Alert,Confused  Orientation Level: Oriented to person  Cognition: Impaired decision making,Decreased command following,Decreased attention/concentration     Hearing:    Range Of Motion:  AROM: Generally decreased, functional                       Strength:    Strength: Grossly decreased, non-functional                       Functional Mobility:  Bed Mobility:  Rolling: Moderate assistance;Assist x2  Supine to Sit: Moderate assistance;Assist x2  Sit to Supine: Moderate assistance;Assist x2  Scooting: Moderate assistance;Assist x2  Transfers:                             Balance:   Sitting: Impaired;High guard; With support  Sitting - Static: Fair (occasional)  Sitting - Dynamic: Poor (constant support)  Standing:  (Not tested)  Ambulation/Gait Training:                                                        Functional Measure:  [address] [place] [address] Mobility Inpatient Short Form  How much difficulty does the patient currently have. .. Unable A Lot A Little None   1. Turning over in bed (including adjusting bedclothes, sheets and blankets)? [] 1   [x] 2   [] 3   [] 4   2. Sitting down on and standing up from a chair with arms ( e.g., wheelchair, bedside commode, etc.)   [x] 1   [] 2   [] 3   [] 4   3.   Moving from lying on back to sitting on the side of the bed? [] 1   [x] 2   [] 3   [] 4          How much help from another person does the patient currently need. .. Total A Lot A Little None   4. Moving to and from a bed to a chair (including a wheelchair)? [x] 1   [] 2   [] 3   [] 4   5. Need to walk in hospital room? [x] 1   [] 2   [] 3   [] 4   6. Climbing 3-5 steps with a railing? [x] 1   [] 2   [] 3   [] 4   © , Trustees of [address] [place] [address] [address] [address], under license to [employer]. All rights reserved     Score:  Initial:  Most Recent: X (Date: 2021 )   Interpretation of Tool:  Represents activities that are increasingly more difficult (i.e. Bed mobility, Transfers, Gait). Score 24 23 22-20 19-15 14-10 9-7 6   Modifier CH CI CJ CK CL CM CN         Physical Therapy Evaluation Charge Determination   History Examination Presentation Decision-Making   LOW Complexity : Zero comorbidities / personal factors that will impact the outcome / POC LOW Complexity : 1-2 Standardized tests and measures addressing body structure, function, activity limitation and / or participation in recreation  LOW Complexity : Stable, uncomplicated  Other outcome measures [facility] 6        Based on the above components, the patient evaluation is determined to be of the following complexity level: LOW     Pain Ratin/10 faces    Activity Tolerance:   Fair    After treatment patient left in no apparent distress:   Supine in bed, Heels elevated for pressure relief, Call bell within reach, Bed / chair alarm activated, and Caregiver / family present . GOALS:    Problem: Mobility Impaired (Adult and Pediatric)  Goal: *Acute Goals and Plan of Care (Insert Text)  Description: Patient will move from supine to sit and sit to supine , scoot up and down, and roll side to side in bed with moderate assistance  within 7 day(s). Patient will transfer from bed to chair and chair to bed with moderate assistance  using the least restrictive device within 7 day(s). Patient will improve static sitting balance to supervision within 1 week(s). Outcome: Progressing Towards Goal       COMMUNICATION/EDUCATION:   The patients plan of care was discussed with: Occupational therapist and Registered nurse. Patient is unable to participate in goal setting and plan of care. Thank you for this referral.  David Schwarz, PT.    Time Calculation: 20 mins

## 2021-12-26 NOTE — PROGRESS NOTES
OCCUPATIONAL THERAPY EVALUATION  Patient: Karli Wilson (20 y.o. female)  Date: 12/26/2021  Primary Diagnosis: Hypertensive emergency [I16.1]        Precautions: Ax2, Fall risk       ASSESSMENT  Pt is a 79 y/o F with PMH of HTN  presenting to [facility] with c/o seizure, admitted 12/22/21 and being treated for a hypertensive emergency. Pt received semi-supine in bed upon arrival, and agreeable to OT evaluation at this time. Per pt report, pt lives with son in a one-story home. Per chart review, pt requires assist with ADLs and is Mod I using wheelchair and RW for mobility at [facility]. Based on current observations, pt presents with deficits in generalized strength/AROM, sitting balance, functional activity tolerance, coordination, and cognition impacting overall performance of ADLs and functional transfers/mobility. Pt demonstrates decreased AROM as pt unable to perform full ROM with shoulder flexion. Pt requires total A for feeding including container management, utensil to mouth, and drink to mouth while lying supine in bed. Pt demonstrates decreased FM and GM coordination as pt requires assist to locate and grab bed handrails while performing bed mobility. Pt required verbal and tactile cuing to release R hand grasp of bed rail while seated EOB. Pt requires mod Ax2 for bed mobility including rolling, sup>sit EOB, scooting, and sit EOB>sup. Pt demonstrates decreased sitting balance while EOB with a posterior lean, however, pt demonstrates ability to maintain posture and balance as pt attempted to correct postural lean. Pt requires total A for toileting as pt required assist to place bed pan while supine in bed. Overall, pt tolerates session fair. Pt would benefit from continued skilled OT services to address current impairments and improve IND and safety with self cares and functional transfers/mobility. Recommend discharge to SNF once medically appropriate.     Other factors to consider for discharge: PLOF, home support, current level of assist     Patient will benefit from skilled therapy intervention to address the above noted impairments. PLAN :  Recommendations and Planned Interventions: self care training, functional mobility training, therapeutic exercise, therapeutic activities, endurance activities, patient education and family training/education    Frequency/Duration: Patient will be followed by occupational therapy:  2-3x/week to address goals. Recommendation for discharge: (in order for the patient to meet his/her long term goals)  Hilton Fountain    This discharge recommendation:  Has been made in collaboration with the attending provider and/or case management    IF patient discharges home will need the following DME: bedside commode and shower chair       SUBJECTIVE:   Patient stated I need to use the bathroom.     OBJECTIVE DATA SUMMARY:   HISTORY:   Past Medical History:   Diagnosis Date    Hypertension    No past surgical history on file. Expanded or extensive additional review of patient history:     Home Situation  Home Environment: Private residence  Living Alone: No  Support Systems: Child(agustín)  Current DME Used/Available at Home: [employer]    Hand dominance: Right    EXAMINATION OF PERFORMANCE DEFICITS:  Cognitive/Behavioral Status:  Neurologic State: Alert;Confused  Orientation Level: Oriented to person  Cognition: Impaired decision making;Decreased command following;Decreased attention/concentration           Range of Motion:  AROM: Grossly decreased, non-functional                         Strength:  Strength: Grossly decreased, non-functional                Coordination:     Fine Motor Skills-Upper: Left Impaired;Right Impaired    Gross Motor Skills-Upper: Left Impaired;Right Impaired      Balance:  Sitting: Impaired; With support  Sitting - Static: Fair (occasional)  Sitting - Dynamic: Poor (constant support)  Standing:  (Not tested)    Functional Mobility and Transfers for ADLs:  Bed Mobility:  Rolling: Moderate assistance;Assist x2  Supine to Sit: Moderate assistance;Assist x2  Sit to Supine: Moderate assistance;Assist x2  Scooting: Moderate assistance;Assist x2      ADL Intervention and task modifications:  Feeding  Container Management: Total assistance (dependent)  Cutting Food: Total assistance (dependent)  Utensil Management: Total assistance (dependent)  Food to Mouth: Total assistance (dependent)  Drink to Mouth: Total assistance (dependent)      Toileting  Toileting Assistance: Total assistance(dependent)  Bowel Hygiene: Total assistance (dependent)  Clothing Management: Total assistance (dependent)         Therapeutic Exercise:  Pt would benefit from UE HEP initiated at next session. Functional Measure:    [facility] [employer] AM-PACTM \"6 Clicks\"                                                       Daily Activity Inpatient Short Form  How much help from another person does the patient currently need. .. Total; A Lot A Little None   1. Putting on and taking off regular lower body clothing? [x]  1 []  2 []  3 []  4   2. Bathing (including washing, rinsing, drying)? [x]  1 []  2 []  3 []  4   3. Toileting, which includes using toilet, bedpan or urinal? [x] 1 []  2 []  3 []  4   4. Putting on and taking off regular upper body clothing? []  1 [x]  2 []  3 []  4   5. Taking care of personal grooming such as brushing teeth? []  1 [x]  2 []  3 []  4   6. Eating meals? [x]  1 []  2 []  3 []  4   © 2007, Trustees of [facility] [employer], under license to [employer]. All rights reserved     Score: 8/24     Interpretation of Tool:  Represents clinically-significant functional categories (i.e. Activities of daily living).   Percentage of Impairment CH    0%   CI    1-19% CJ    20-39% CK    40-59% CL    60-79% CM    80-99% CN     100%   AMPA  Score 6-24 24 23 20-22 15-19 10-14 7-9 6         Occupational Therapy Evaluation Charge Determination   History Examination Decision-Making   LOW Complexity : Brief history review  LOW Complexity : 1-3 performance deficits relating to physical, cognitive , or psychosocial skils that result in activity limitations and / or participation restrictions  LOW Complexity : No comorbidities that affect functional and no verbal or physical assistance needed to complete eval tasks       Based on the above components, the patient evaluation is determined to be of the following complexity level: LOW   Pain Ratin/10    Activity Tolerance:   Fair  Please refer to the flowsheet for vital signs taken during this treatment. After treatment patient left in no apparent distress:    Supine in bed, Call bell within reach, Bed / chair alarm activated, Side rails x 3 and nurse present    COMMUNICATION/EDUCATION:   The patients plan of care was discussed with: Physical therapist and Registered nurse. Patient/family agree to work toward stated goals and plan of care. This patients plan of care is appropriate for delegation to [facility]. PT/OT sessions occurred together for increased safety of pt and clinician. Thank you for this referral.  Juan Francisco Collier OT  Time Calculation: 11 mins    Problem: Self Care Deficits Care Plan (Adult)  Goal: *Acute Goals and Plan of Care (Insert Text)  Description: 1. Pt will be min A sup <> sit in prep for EOB ADLs  2. Pt will be min A grooming sitting EOB  3. Pt will be min A LE dressing sitting EOB/long sit  4. Pt will be mod A sit <>  prep for toileting LRAD  5. Pt will be mod A toileting/toilet transfer/cloth mgmt LRAD  6.  Pt will be mod A following UE HEP in prep for self care tasks     Outcome: Not Met

## 2021-12-27 PROCEDURE — 65270000029 HC RM PRIVATE

## 2021-12-27 PROCEDURE — 74011250637 HC RX REV CODE- 250/637: Performed by: FAMILY MEDICINE

## 2021-12-27 PROCEDURE — 74011250636 HC RX REV CODE- 250/636: Performed by: INTERNAL MEDICINE

## 2021-12-27 PROCEDURE — 74011250637 HC RX REV CODE- 250/637: Performed by: INTERNAL MEDICINE

## 2021-12-27 RX ADMIN — CLONIDINE HYDROCHLORIDE 0.2 MG: 0.2 TABLET ORAL at 20:43

## 2021-12-27 RX ADMIN — HYDRALAZINE HYDROCHLORIDE 10 MG: 20 INJECTION INTRAMUSCULAR; INTRAVENOUS at 09:03

## 2021-12-27 RX ADMIN — AMLODIPINE BESYLATE 5 MG: 5 TABLET ORAL at 09:05

## 2021-12-27 RX ADMIN — SODIUM CHLORIDE, PRESERVATIVE FREE 10 ML: 5 INJECTION INTRAVENOUS at 15:36

## 2021-12-27 RX ADMIN — AMLODIPINE BESYLATE 5 MG: 5 TABLET ORAL at 20:43

## 2021-12-27 RX ADMIN — LEVETIRACETAM 500 MG: 500 TABLET, FILM COATED ORAL at 20:43

## 2021-12-27 RX ADMIN — OXYCODONE 5 MG: 5 TABLET ORAL at 03:00

## 2021-12-27 RX ADMIN — HYDRALAZINE HYDROCHLORIDE 10 MG: 20 INJECTION INTRAMUSCULAR; INTRAVENOUS at 22:09

## 2021-12-27 RX ADMIN — BISACODYL 5 MG: 5 TABLET, COATED ORAL at 15:45

## 2021-12-27 RX ADMIN — OXYCODONE 5 MG: 5 TABLET ORAL at 15:45

## 2021-12-27 RX ADMIN — ENOXAPARIN SODIUM 40 MG: 100 INJECTION SUBCUTANEOUS at 15:49

## 2021-12-27 RX ADMIN — OXYCODONE 5 MG: 5 TABLET ORAL at 09:17

## 2021-12-27 RX ADMIN — SODIUM CHLORIDE, PRESERVATIVE FREE 10 ML: 5 INJECTION INTRAVENOUS at 20:43

## 2021-12-27 RX ADMIN — LEVETIRACETAM 500 MG: 500 TABLET, FILM COATED ORAL at 09:05

## 2021-12-27 RX ADMIN — CLONIDINE HYDROCHLORIDE 0.2 MG: 0.2 TABLET ORAL at 09:05

## 2021-12-27 RX ADMIN — SODIUM CHLORIDE, PRESERVATIVE FREE 10 ML: 5 INJECTION INTRAVENOUS at 05:52

## 2021-12-27 NOTE — PROGRESS NOTES
CM spoke to patient's son regarding SNF choices. Patient's son states that he does not know which facility he wants his mother to go to. States he is coming up to the hospital [facility] and will give CM his choice. CM explained that no one will be around to talk to about SNF choices later this evening, but will be back in the morning. Son states he will be on the unit around 9:45am tomorrow to discuss choices. CM will continue to follow.

## 2021-12-27 NOTE — PROGRESS NOTES
Progress Note    Patient: Jan Morales MRN: [id number]  SSN: [id number]    YOB: 1955  Age: 78 y.o. Sex: female      Admit Date: 12/22/2021    LOS: 5 days     Subjective:     78years old lady possible new onset seizures with metabolic encephalopathy    Patient is alert awake not in any distress    Objective:     Vitals:    12/26/21 1959 12/26/21 2317 12/27/21 0847 12/27/21 1116   BP: 138/87 (!) 148/78 (!) 206/126 127/84   Pulse: 65 70 74    Resp: 20 18 18    Temp: 97.6 °F (36.4 °C) 98.9 °F (37.2 °C) 98.3 °F (36.8 °C)    SpO2: 99% 100% 100%    Weight:       Height:            Intake and Output:  Current Shift: No intake/output data recorded. Last three shifts: 12/25 1901 - 12/27 0700  In: 360 [P.O.:360]  Out: 300 [Urine:300]    Physical Exam:   General:  Alert, cooperative, no distress, appears stated age. Eyes:  Conjunctivae/corneas clear. PERRL, EOMs intact. Fundi benign   Ears:  Normal TMs and external ear canals both ears. Nose: Nares normal. Septum midline. Mucosa normal. No drainage or sinus tenderness. Mouth/Throat: Lips, mucosa, and tongue normal. Teeth and gums normal.   Neck: Supple, symmetrical, trachea midline, no adenopathy, thyroid: no enlargment/tenderness/nodules, no carotid bruit and no JVD. Back:   Symmetric, no curvature. ROM normal. No CVA tenderness. Lungs:   Clear to auscultation bilaterally. Heart:  Regular rate and rhythm, S1, S2 normal, no murmur, click, rub or gallop. Abdomen:   Soft, non-tender. Bowel sounds normal. No masses,  No organomegaly. Extremities: Extremities normal, atraumatic, no cyanosis or edema. Pulses: 2+ and symmetric all extremities. Skin: Skin color, texture, turgor normal. No rashes or lesions   Lymph nodes: Cervical, supraclavicular, and axillary nodes normal.   Neurologic: CNII-XII intact. Normal strength, sensation and reflexes throughout. Lab/Data Review: All lab results for the last 24 hours reviewed.      No results found for this or any previous visit (from the past 24 hour(s)).       Assessment:     Active Problems:    Hypertensive emergency (12/22/2021)    New onset seizure  Bradycardia  Thrombocytopenia    Plan:     Continue present work-up adjust medication for better blood pressure control    On amlodipine 5 mg twice a day  Clonidine 0.2 mg twice a day      Keppra 500 mg twice daily    Repeat the labs in the morning    PT OT consult    Discharge planning to SNF care    Signed By: Mey Posadas MD     December 27, 2021

## 2021-12-28 PROCEDURE — 97530 THERAPEUTIC ACTIVITIES: CPT

## 2021-12-28 PROCEDURE — 65270000029 HC RM PRIVATE

## 2021-12-28 PROCEDURE — 97110 THERAPEUTIC EXERCISES: CPT

## 2021-12-28 PROCEDURE — 74011250637 HC RX REV CODE- 250/637: Performed by: INTERNAL MEDICINE

## 2021-12-28 PROCEDURE — 74011250637 HC RX REV CODE- 250/637: Performed by: FAMILY MEDICINE

## 2021-12-28 RX ADMIN — AMLODIPINE BESYLATE 5 MG: 5 TABLET ORAL at 21:45

## 2021-12-28 RX ADMIN — LEVETIRACETAM 500 MG: 500 TABLET, FILM COATED ORAL at 21:45

## 2021-12-28 RX ADMIN — CLONIDINE HYDROCHLORIDE 0.2 MG: 0.2 TABLET ORAL at 09:09

## 2021-12-28 RX ADMIN — SODIUM CHLORIDE, PRESERVATIVE FREE 10 ML: 5 INJECTION INTRAVENOUS at 17:02

## 2021-12-28 RX ADMIN — AMLODIPINE BESYLATE 5 MG: 5 TABLET ORAL at 09:10

## 2021-12-28 RX ADMIN — SODIUM CHLORIDE, PRESERVATIVE FREE 10 ML: 5 INJECTION INTRAVENOUS at 21:49

## 2021-12-28 RX ADMIN — LEVETIRACETAM 500 MG: 500 TABLET, FILM COATED ORAL at 09:10

## 2021-12-28 RX ADMIN — OXYCODONE 5 MG: 5 TABLET ORAL at 17:05

## 2021-12-28 RX ADMIN — CLONIDINE HYDROCHLORIDE 0.2 MG: 0.2 TABLET ORAL at 21:45

## 2021-12-28 RX ADMIN — SODIUM CHLORIDE, PRESERVATIVE FREE 10 ML: 5 INJECTION INTRAVENOUS at 06:07

## 2021-12-28 NOTE — PROGRESS NOTES
Problem: Mobility Impaired (Adult and Pediatric)  Goal: *Acute Goals and Plan of Care (Insert Text)  Description: Patient will move from supine to sit and sit to supine , scoot up and down, and roll side to side in bed with moderate assistance  within 7 day(s). Patient will transfer from bed to chair and chair to bed with moderate assistance  using the least restrictive device within 7 day(s). Patient will improve static sitting balance to supervision within 1 week(s). Outcome: Not Progressing Towards Goal   PHYSICAL THERAPY TREATMENT  Patient: Rudi Rae (46 y.o. female)  Date: 12/28/2021  Diagnosis: Hypertensive emergency [I16.1] <principal problem not specified>       Precautions:    Chart, physical therapy assessment, plan of care and goals were reviewed. ASSESSMENT  Patient continues with skilled PT services and is not progressing towards goals. Assisted pt through LE supine therex. AAROM-PROM. Easily distracted, frequent cues required to stay on task. Nsg entered room to assist with rolling/repositioning. Total A x2. Current Level of Function Impacting Discharge (mobility/balance): Requires total Ax 2    Other factors to consider for discharge: Bedbound         PLAN :  Patient continues to benefit from skilled intervention to address the above impairments. Continue treatment per established plan of care. to address goals. Recommendation for discharge: (in order for the patient to meet his/her long term goals)  Oracio Howe.:   Patient stated I want to stay here tonight, it's too dangerous outside.     OBJECTIVE DATA SUMMARY:   Critical Behavior:  Neurologic State: Alert  Orientation Level: Disoriented to place,Oriented to situation,Oriented to time,Oriented to person  Cognition: Decreased attention/concentration,Decreased command following,Impaired decision making     Functional Mobility Training:  Bed Mobility:  Rolling:  Total assistance;Assist x2  Scooting: Total assistance (to [facility])              Therapeutic Exercises:   AP x10  Heel slides x10  Hip abd/add x10  SLR x10  AAROM-PROM  Pain Rating:  C/o pain in low back    Activity Tolerance:   Fair      After treatment patient left in no apparent distress:   Supine in bed, Heels elevated for pressure relief, Patient positioned in R sidelying for pressure relief, Call bell within reach, Bed / chair alarm activated, Caregiver / family present, and PCT in room to assist with feeding pt. COMMUNICATION/COLLABORATION:   The patients plan of care was discussed with: Registered nurse.      Raimundo Alvarez   Time Calculation: 20 mins

## 2021-12-28 NOTE — PROGRESS NOTES
CM met with patients son Ella Foreman [phone] at [facility]. CM gave him choices for SNF. He originally wanted [facility]. CM informed him that the current recommendations are for SNF and IRF would more than likely not get approved by patients insurance. Son verbalized understanding, but still would like referral sent to [facility] in [place], [place]. Choices also obtained for [facility] and [facility].

## 2021-12-28 NOTE — PROGRESS NOTES
BULL spoke with [facility] liaison. She met with patient and spoke with patient's son to explain that patient is not a candidate for their facility. BULL will continue to pursue SNF choices at this time.

## 2021-12-28 NOTE — PROGRESS NOTES
OCCUPATIONAL THERAPY TREATMENT  Patient: Rukhsana Darling (86 y.o. female)  Date: 12/28/2021  Diagnosis: Hypertensive emergency [I16.1] <principal problem not specified>       Precautions:    Chart, occupational therapy assessment, plan of care, and goals were reviewed. ASSESSMENT  Patient continues with skilled OT services and is progressing towards goals. Upon TABAR[facility] arrival, pt semi supine in bed and agreeable to tx session. [place]/donning of bunny boots completed with total A. Pt noted to be soiled with urine upon arrival.  Rolling completed with MaxA and changing of linens and renato pad completed. Bladder hygiene completed with total A. Pt completed supine>sit with MaxA and reporting pain in back during transfer. Pt sat at EOB ~10 seconds before needing to return to supine with MaxA. Pt noted with poor sitting balance at this time. Scooting to [facility] completed with total A. Pt left semi supine in bed with PCTs in room. Will continue to follow pt throughout remainder of stay and progress towards OT goals. Recommending SNF at discharge when medically appropriate. Current Level of Function Impacting Discharge (ADLs): Max-total A bed mobility, total A toileting, total A LB dressing    Other factors to consider for discharge: PLOF, home support, current level of assist         PLAN :  Patient continues to benefit from skilled intervention to address the above impairments. Continue treatment per established plan of care. to address goals. Recommend next OT session: supine>sit, EOB grooming    Recommendation for discharge: (in order for the patient to meet his/her long term goals)  Hilton Fountain    This discharge recommendation:  Has been made in collaboration with the attending provider and/or case management    IF patient discharges home will need the following DME: TBD       SUBJECTIVE:   Patient stated I am hungry.     OBJECTIVE DATA SUMMARY:   Cognitive/Behavioral Status:  Neurologic State: Alert  Orientation Level: Oriented to person;Oriented to place  Cognition: Decreased attention/concentration; Impaired decision making    Functional Mobility and Transfers for ADLs:  Bed Mobility:  Rolling: Maximum assistance  Supine to Sit: Maximum assistance  Sit to Supine: Maximum assistance  Scooting: Total assistance (to [facility])    Balance:  Sitting: Impaired; With support  Sitting - Static: Poor (constant support)    ADL Intervention:  Lower Body Dressing Assistance  Socks: Total assistance (dependent)    Toileting  Bladder Hygiene: Total assistance (dependent)    Pain:  Pain reported in back but no formal rating given; 7-8/10 via FACES scale    Activity Tolerance:   Fair and requires rest breaks  Please refer to the flowsheet for vital signs taken during this treatment. After treatment patient left in no apparent distress:   Supine in bed, Call bell within reach, and PCTs in room    COMMUNICATION/COLLABORATION:   The patients plan of care was discussed with: Registered nurse and Certified nursing assistant/patient care technician. RAYSHAWN Taylor  Time Calculation: 33 mins    Problem: Self Care Deficits Care Plan (Adult)  Goal: *Acute Goals and Plan of Care (Insert Text)  Description: 1. Pt will be min A sup <> sit in prep for EOB ADLs  2. Pt will be min A grooming sitting EOB  3. Pt will be min A LE dressing sitting EOB/long sit  4. Pt will be mod A sit <>  prep for toileting LRAD  5. Pt will be mod A toileting/toilet transfer/cloth mgmt LRAD  6.   Pt will be mod A following UE HEP in prep for self care tasks     Outcome: Progressing Towards Goal

## 2021-12-28 NOTE — PROGRESS NOTES
Progress Note    Patient: Jan Morales MRN: [id number]  SSN: [id number]    YOB: 1955  Age: 78 y.o. Sex: female      Admit Date: 12/22/2021    LOS: 6 days     Subjective:     78years old lady possible new onset seizures with metabolic encephalopathy    Patient is alert awake not in any distress    Objective:     Vitals:    12/27/21 2040 12/27/21 2207 12/27/21 2358 12/28/21 0741   BP: (!) 160/100 (!) 176/108 (!) 146/94 (!) 178/96   Pulse: (!) 56  (!) 57 (!) 54   Resp: 17 18 18   Temp: 98.1 °F (36.7 °C)  97.8 °F (36.6 °C) 98.3 °F (36.8 °C)   SpO2: 100%  99% 100%   Weight:       Height:            Intake and Output:  Current Shift: No intake/output data recorded. Last three shifts: 12/26 1901 - 12/28 0700  In: 500 [P.O.:500]  Out: -     Physical Exam:   General:  Alert, cooperative, no distress, appears stated age. Eyes:  Conjunctivae/corneas clear. PERRL, EOMs intact. Fundi benign   Ears:  Normal TMs and external ear canals both ears. Nose: Nares normal. Septum midline. Mucosa normal. No drainage or sinus tenderness. Mouth/Throat: Lips, mucosa, and tongue normal. Teeth and gums normal.   Neck: Supple, symmetrical, trachea midline, no adenopathy, thyroid: no enlargment/tenderness/nodules, no carotid bruit and no JVD. Back:   Symmetric, no curvature. ROM normal. No CVA tenderness. Lungs:   Clear to auscultation bilaterally. Heart:  Regular rate and rhythm, S1, S2 normal, no murmur, click, rub or gallop. Abdomen:   Soft, non-tender. Bowel sounds normal. No masses,  No organomegaly. Extremities: Extremities normal, atraumatic, no cyanosis or edema. Pulses: 2+ and symmetric all extremities. Skin: Skin color, texture, turgor normal. No rashes or lesions   Lymph nodes: Cervical, supraclavicular, and axillary nodes normal.   Neurologic: CNII-XII intact. Normal strength, sensation and reflexes throughout. Lab/Data Review: All lab results for the last 24 hours reviewed. No results found for this or any previous visit (from the past 24 hour(s)).       Assessment:     Active Problems:    Hypertensive emergency (12/22/2021)    New onset seizure  Bradycardia  Thrombocytopenia    Plan:     Continue present work-up adjust medication for better blood pressure control    On amlodipine 5 mg twice a day  Clonidine 0.2 mg twice a day      Keppra 500 mg twice daily    Repeat the labs in the morning    PT OT consult    Discharge planning to SNF care    Signed By: Ry Sheehan MD     December 28, 2021

## 2021-12-28 NOTE — PROGRESS NOTES
Problem: Pressure Injury - Risk of  Goal: *Prevention of pressure injury  Description: Document Baldo Scale and appropriate interventions in the flowsheet. Outcome: Progressing Towards Goal  Note: Pressure Injury Interventions:  Sensory Interventions: Assess changes in LOC,Keep linens dry and wrinkle-free,Maintain/enhance activity level,Minimize linen layers    Moisture Interventions: Internal/External urinary devices,Minimize layers,Moisture barrier,Absorbent underpads,Apply protective barrier, creams and emollients    Activity Interventions: Assess need for specialty bed,Pressure redistribution bed/mattress(bed type),PT/OT evaluation    Mobility Interventions: Assess need for specialty bed,Float heels,HOB 30 degrees or less,Pressure redistribution bed/mattress (bed type),PT/OT evaluation    Nutrition Interventions: Document food/fluid/supplement intake    Friction and Shear Interventions: Apply protective barrier, creams and emollients,HOB 30 degrees or less,Minimize layers          Problem: Falls - Risk of  Goal: *Absence of Falls  Description: Document Haven Fall Risk and appropriate interventions in the flowsheet.   Outcome: Progressing Towards Goal  Note: Fall Risk Interventions:       Mentation Interventions: Bed/chair exit alarm,Door open when patient unattended,Reorient patient,More frequent rounding,Room close to nurse's station    Medication Interventions: Bed/chair exit alarm,Patient to call before getting OOB,Teach patient to arise slowly    Elimination Interventions: Bed/chair exit alarm,Call light in reach,Patient to call for help with toileting needs

## 2021-12-29 LAB
LEVETIRACETAM SERPL-MCNC: 40.4 UG/ML (ref 10–40)
LEVETIRACETAM SERPL-MCNC: 51 UG/ML (ref 10–40)

## 2021-12-29 PROCEDURE — 74011250637 HC RX REV CODE- 250/637: Performed by: FAMILY MEDICINE

## 2021-12-29 PROCEDURE — 74011250637 HC RX REV CODE- 250/637: Performed by: INTERNAL MEDICINE

## 2021-12-29 PROCEDURE — 74011250636 HC RX REV CODE- 250/636: Performed by: INTERNAL MEDICINE

## 2021-12-29 PROCEDURE — 65270000029 HC RM PRIVATE

## 2021-12-29 RX ADMIN — CLONIDINE HYDROCHLORIDE 0.2 MG: 0.2 TABLET ORAL at 08:40

## 2021-12-29 RX ADMIN — AMLODIPINE BESYLATE 5 MG: 5 TABLET ORAL at 08:40

## 2021-12-29 RX ADMIN — CLONIDINE HYDROCHLORIDE 0.2 MG: 0.2 TABLET ORAL at 20:19

## 2021-12-29 RX ADMIN — SODIUM CHLORIDE, PRESERVATIVE FREE 10 ML: 5 INJECTION INTRAVENOUS at 04:21

## 2021-12-29 RX ADMIN — LEVETIRACETAM 500 MG: 500 TABLET, FILM COATED ORAL at 20:19

## 2021-12-29 RX ADMIN — ENOXAPARIN SODIUM 40 MG: 100 INJECTION SUBCUTANEOUS at 08:40

## 2021-12-29 RX ADMIN — SODIUM CHLORIDE, PRESERVATIVE FREE 10 ML: 5 INJECTION INTRAVENOUS at 20:20

## 2021-12-29 RX ADMIN — OXYCODONE 5 MG: 5 TABLET ORAL at 20:23

## 2021-12-29 RX ADMIN — AMLODIPINE BESYLATE 5 MG: 5 TABLET ORAL at 20:20

## 2021-12-29 RX ADMIN — LEVETIRACETAM 500 MG: 500 TABLET, FILM COATED ORAL at 08:40

## 2021-12-29 RX ADMIN — SODIUM CHLORIDE, PRESERVATIVE FREE 10 ML: 5 INJECTION INTRAVENOUS at 15:16

## 2021-12-29 NOTE — PROGRESS NOTES
Physician Progress Note      Igncaio Rose  CSN #:                  [id number]  :                       1955  ADMIT DATE:       2021 1:53 PM  [address] [place] DATE:  RESPONDING  PROVIDER #:        Cary Ivey MD          QUERY TEXT:    Dear Dr. Anirudh León or Dr. Philip Sanabria -    Pt admitted with seizure, HTN. Pt noted to have prior CVA. If possible, please document in progress notes and discharge summary if you are evaluating and/or treating any of the following: The medical record reflects the following:  Risk Factors: 78 F presented with seizure, noted with significantly elevated blood pressures and AMS  Clinical Indicators: patient with hx of previous CVA; noted with left sided weakness per ED MD note; MRI negative for acute process  Treatment: labs, CT scan, MRI, PT and OT evals, Neurology consult    Thank you,  KESHAWN ChawlaN, RN, [facility]  Clinical   [phone]  Options provided:  -- Left sided weakness is a sequela of prior CVA  -- Seizure is a sequela of prior CVA  -- Left sided weakness and seizure are a sequela of prior CVA  -- Left sided weakness is not a sequela of prior CVA  -- Seizure is not a sequela of prior CVA  -- Left sided weakness and seizure are not a sequela of prior CVA  -- Other - I will add my own diagnosis  -- Disagree - Not applicable / Not valid  -- Disagree - Clinically unable to determine / Unknown  -- Refer to Clinical Documentation Reviewer    PROVIDER RESPONSE TEXT:    Left sided weakness is a sequela of prior CVA.     Query created by: Damian Stewart on 2021 7:13 AM      Electronically signed by:  Cary Ivey MD 2021 10:05 AM

## 2021-12-29 NOTE — PROGRESS NOTES
BULL heard from [facility] and [address] [place] [address] but they are not able to accept patient at this time. CM called son to make him aware of this. He stated that he is on his way to the hospital and will make a decision on other facilities. BULL will continue to follow.

## 2021-12-29 NOTE — ROUTINE PROCESS
LOS two person skin assessment performed by Sola Purcell RN and myself. Patient noted to have wound to mid sacrum. Wedge pillow and heel boots in place.

## 2021-12-29 NOTE — PROGRESS NOTES
Progress Note    Patient: Moreen Krabbe MRN: [id number]  SSN: [id number]    YOB: 1955  Age: 78 y.o. Sex: female      Admit Date: 12/22/2021    LOS: 7 days     Subjective:     78years old lady possible new onset seizures with metabolic encephalopathy    Patient is alert awake not in any distress    Objective:     Vitals:    12/28/21 1928 12/28/21 2145 12/28/21 2319 12/29/21 0840   BP: 121/80 132/89 (!) 130/90 (!) 180/102   Pulse: 67  62 (!) 53   Resp: 16  17 20   Temp: 98.4 °F (36.9 °C)  98.9 °F (37.2 °C) 98.3 °F (36.8 °C)   SpO2: 99%  99% 100%   Weight:       Height:            Intake and Output:  Current Shift: No intake/output data recorded. Last three shifts: 12/27 1901 - 12/29 0700  In: -   Out: 775 [Urine:775]    Physical Exam:   General:  Alert, cooperative, no distress, appears stated age. Eyes:  Conjunctivae/corneas clear. PERRL, EOMs intact. Fundi benign   Ears:  Normal TMs and external ear canals both ears. Nose: Nares normal. Septum midline. Mucosa normal. No drainage or sinus tenderness. Mouth/Throat: Lips, mucosa, and tongue normal. Teeth and gums normal.   Neck: Supple, symmetrical, trachea midline, no adenopathy, thyroid: no enlargment/tenderness/nodules, no carotid bruit and no JVD. Back:   Symmetric, no curvature. ROM normal. No CVA tenderness. Lungs:   Clear to auscultation bilaterally. Heart:  Regular rate and rhythm, S1, S2 normal, no murmur, click, rub or gallop. Abdomen:   Soft, non-tender. Bowel sounds normal. No masses,  No organomegaly. Extremities: Extremities normal, atraumatic, no cyanosis or edema. Pulses: 2+ and symmetric all extremities. Skin: Skin color, texture, turgor normal. No rashes or lesions   Lymph nodes: Cervical, supraclavicular, and axillary nodes normal.   Neurologic: CNII-XII intact. Normal strength, sensation and reflexes throughout. Lab/Data Review: All lab results for the last 24 hours reviewed.      No results found for this or any previous visit (from the past 24 hour(s)).       Assessment:     Active Problems:    Hypertensive emergency (12/22/2021)    New onset seizure  Bradycardia  Thrombocytopenia    Plan:     Continue present work-up adjust medication for better blood pressure control    On amlodipine 5 mg twice a day  Clonidine 0.2 mg twice a day      Keppra 500 mg twice daily    Repeat the labs in the morning    PT OT consult    Discharge planning to SNF care    Signed By: Martell Lewis MD     December 29, 2021

## 2021-12-30 PROCEDURE — 65270000029 HC RM PRIVATE

## 2021-12-30 PROCEDURE — 74011250637 HC RX REV CODE- 250/637: Performed by: INTERNAL MEDICINE

## 2021-12-30 PROCEDURE — 74011250637 HC RX REV CODE- 250/637: Performed by: FAMILY MEDICINE

## 2021-12-30 PROCEDURE — 97530 THERAPEUTIC ACTIVITIES: CPT

## 2021-12-30 PROCEDURE — 74011250636 HC RX REV CODE- 250/636: Performed by: INTERNAL MEDICINE

## 2021-12-30 RX ORDER — HYDRALAZINE HYDROCHLORIDE 50 MG/1
50 TABLET, FILM COATED ORAL 2 TIMES DAILY
Status: DISCONTINUED | OUTPATIENT
Start: 2021-12-30 | End: 2022-01-07 | Stop reason: HOSPADM

## 2021-12-30 RX ADMIN — LEVETIRACETAM 500 MG: 500 TABLET, FILM COATED ORAL at 10:09

## 2021-12-30 RX ADMIN — SODIUM CHLORIDE, PRESERVATIVE FREE 10 ML: 5 INJECTION INTRAVENOUS at 14:45

## 2021-12-30 RX ADMIN — HYDRALAZINE HYDROCHLORIDE 50 MG: 50 TABLET, FILM COATED ORAL at 21:35

## 2021-12-30 RX ADMIN — SODIUM CHLORIDE, PRESERVATIVE FREE 10 ML: 5 INJECTION INTRAVENOUS at 05:56

## 2021-12-30 RX ADMIN — AMLODIPINE BESYLATE 5 MG: 5 TABLET ORAL at 21:35

## 2021-12-30 RX ADMIN — HYDRALAZINE HYDROCHLORIDE 50 MG: 50 TABLET, FILM COATED ORAL at 11:17

## 2021-12-30 RX ADMIN — ENOXAPARIN SODIUM 40 MG: 100 INJECTION SUBCUTANEOUS at 10:09

## 2021-12-30 RX ADMIN — LEVETIRACETAM 500 MG: 500 TABLET, FILM COATED ORAL at 21:35

## 2021-12-30 RX ADMIN — SODIUM CHLORIDE, PRESERVATIVE FREE 10 ML: 5 INJECTION INTRAVENOUS at 21:36

## 2021-12-30 RX ADMIN — CLONIDINE HYDROCHLORIDE 0.2 MG: 0.2 TABLET ORAL at 21:35

## 2021-12-30 RX ADMIN — AMLODIPINE BESYLATE 5 MG: 5 TABLET ORAL at 10:09

## 2021-12-30 RX ADMIN — BISACODYL 5 MG: 5 TABLET, COATED ORAL at 11:16

## 2021-12-30 RX ADMIN — CLONIDINE HYDROCHLORIDE 0.2 MG: 0.2 TABLET ORAL at 10:09

## 2021-12-30 NOTE — PROGRESS NOTES
Progress Note    Patient: oTnio Johns MRN: [id number]  SSN: [id number]    YOB: 1955  Age: 78 y.o. Sex: female      Admit Date: 12/22/2021    LOS: 8 days     Subjective:     78years old lady possible new onset seizures with metabolic encephalopathy    Patient is alert awake not in any distress    Objective:     Vitals:    12/29/21 0840 12/29/21 1516 12/29/21 2017 12/30/21 0716   BP: (!) 180/102 (!) 148/90 (!) 149/93 (!) 193/114   Pulse: (!) 53 60 (!) 56 (!) 58   Resp: 20 18 18 18   Temp: 98.3 °F (36.8 °C) 98.4 °F (36.9 °C) 99.5 °F (37.5 °C) 98.2 °F (36.8 °C)   SpO2: 100% 99% 100% 100%   Weight:       Height:            Intake and Output:  Current Shift: No intake/output data recorded. Last three shifts: 12/28 1901 - 12/30 0700  In: -   Out: 1200 [Urine:1200]    Physical Exam:   General:  Alert, cooperative, no distress, appears stated age. Eyes:  Conjunctivae/corneas clear. PERRL, EOMs intact. Fundi benign   Ears:  Normal TMs and external ear canals both ears. Nose: Nares normal. Septum midline. Mucosa normal. No drainage or sinus tenderness. Mouth/Throat: Lips, mucosa, and tongue normal. Teeth and gums normal.   Neck: Supple, symmetrical, trachea midline, no adenopathy, thyroid: no enlargment/tenderness/nodules, no carotid bruit and no JVD. Back:   Symmetric, no curvature. ROM normal. No CVA tenderness. Lungs:   Clear to auscultation bilaterally. Heart:  Regular rate and rhythm, S1, S2 normal, no murmur, click, rub or gallop. Abdomen:   Soft, non-tender. Bowel sounds normal. No masses,  No organomegaly. Extremities: Extremities normal, atraumatic, no cyanosis or edema. Pulses: 2+ and symmetric all extremities. Skin: Skin color, texture, turgor normal. No rashes or lesions   Lymph nodes: Cervical, supraclavicular, and axillary nodes normal.   Neurologic: CNII-XII intact. Normal strength, sensation and reflexes throughout. Lab/Data Review:   All lab results for the last 24 hours reviewed. No results found for this or any previous visit (from the past 24 hour(s)).       Assessment:     Active Problems:    Hypertensive emergency (12/22/2021)    New onset seizure  Bradycardia  Thrombocytopenia  Hypertension    Plan:     Continue present work-up adjust medication for better blood pressure control    On amlodipine 5 mg twice a day  Clonidine 0.2 mg twice a day  Hydralazine 50 mg twice a day    Keppra 500 mg twice daily    Repeat the labs in the morning    PT OT consult    Discharge planning to SNF care    Signed By: Edith Werner MD     December 30, 2021

## 2021-12-30 NOTE — PROGRESS NOTES
Comprehensive Nutrition Assessment    Type and Reason for Visit: Initial    Nutrition Recommendations/Plan:   Continue Soft and Bite-Sized diet  Add Beneprotein with applesauce BID (170kcals, 12g pro)  Add Magic Cup BID (580kcals, 18g pro)    Nutrition Assessment:  Admitted for seizures and metabolic encephalopathy. Remains inpatient pending SNF placement. Limited interview d/t h/x of CVA. pt endorsed poor appetite and intakes pta however stated she \"likes everything\", unable to comment on wt loss. Pt endorsed consuming 100% of B, no PO documented in I/O yet. Pt agreeable to receiving Beneprotein with applesauce and Magic Cup. No recent labs to assess. Meds: clonidine, keppra, oxycodone. Malnutrition Assessment:  Malnutrition Status:  Mild malnutrition    Context:  Chronic illness     Findings of the 6 clinical characteristics of malnutrition:   Energy Intake:  7 - 75% or less est energy requirements for 1 month or longer (per pt report, poor historian)  Weight Loss:  No significant weight loss     Body Fat Loss:  No significant body fat loss,     Muscle Mass Loss:  No significant muscle mass loss (losses r/t quadriplegia),    Fluid Accumulation:  No significant fluid accumulation,        Estimated Daily Nutrient Needs:  Energy (kcal): 1512kcals (28kcals/kg); Weight Used for Energy Requirements: Current  Protein (g): 65g (1.2g/kg); Weight Used for Protein Requirements: Current  Fluid (ml/day): 1500ml; Method Used for Fluid Requirements: 1 ml/kcal      Nutrition Related Findings:  NFPE finding severe wasting to quadriceps and calves, likely d/t bed bound status. Denies issues with c/s, seems to prefers softer food. +constipation with last BM 12/26, RD helped RN put pt on bedpan today however pt refusing to have BM in bedpan. No edema. Wounds:   Stage II (sacrum)       Current Nutrition Therapies:  ADULT DIET Dysphagia - Soft & Bite Sized;  No Salt Added (3-4 gm)  ADULT ORAL NUTRITION SUPPLEMENT Lunch, Dinner; Frozen Supplement  ADULT ORAL NUTRITION SUPPLEMENT Lunch, Breakfast; Other Supplement; Beneprotein    Anthropometric Measures:  · Height:  5' 4.02\" (162.6 cm)  · Current Body Wt:  53.8 kg (118 lb 11.2 oz) (RD obtained)   · Admission Body Wt:  1.8 oz (12/22)    · Usual Body Wt:   (sebastian)     · Ideal Body Wt:  120 lbs:  98.9 %   · Adjusted Body Weight:  133.5; Weight Adjustment for: Quadriplegia (functional)   · Adjusted BMI:  23    · BMI Category:  Underweight (BMI less than 22) age over 72       Nutrition Diagnosis:   · Underweight related to cognitive or neurological impairment,inadequate protein-energy intake as evidenced by BMI      Nutrition Interventions:   Food and/or Nutrient Delivery: Continue current diet,Start oral nutrition supplement  Nutrition Education and Counseling: Education not appropriate  Coordination of Nutrition Care: Continue to monitor while inpatient,Feeding assistance/environmental change    Goals:  Intakes >/=75% of EENs in >5 days, Wt maintenance within +/-0.5kg in >5 days, Signs of wound healing per nsg assessment in >5 days, Regular Bms every 1-2 days       Nutrition Monitoring and Evaluation:   Behavioral-Environmental Outcomes: None identified  Food/Nutrient Intake Outcomes: Food and nutrient intake,Supplement intake  Physical Signs/Symptoms Outcomes: Weight,Skin,Meal time behavior,Constipation    Discharge Planning:     Too soon to determine     Electronically signed by Pro Felder on 12/30/2021 at 9:16 AM    Contact: Ext [phone], or via [employer]

## 2021-12-30 NOTE — PROGRESS NOTES
PHYSICAL THERAPY TREATMENT  Patient: Arely Blum (79 y.o. female)  Date: 12/30/2021  Diagnosis: Hypertensive emergency [I16.1] <principal problem not specified>       Precautions:    Chart, physical therapy assessment, plan of care and goals were reviewed. ASSESSMENT  Patient continues with skilled PT services and is not progressing towards goals. Patient cont to be limited with mobility and demos confusion limiting progress. Patient req cueing to focus on task and education for mobility. Patient was mod Ax2 rolling, max A x2 for supine<>sit and req constant support for seated balance. Initially agreeable for OOB transfers however patient then becomes confused and complaining of being hungry along with back pain and was not agreeable for transfers. Pt also was not willing to participate in therex sitting EOB and requested to return to supine. Will cont to progress towards goals, d/c recs remain SNF at this time. Current Level of Function Impacting Discharge (mobility/balance): weakness, unsteady    Other factors to consider for discharge: poor mobility         PLAN :  Patient continues to benefit from skilled intervention to address the above impairments. Continue treatment per established plan of care. to address goals. Recommendation for discharge: (in order for the patient to meet his/her long term goals)  Hilton Fountain    This discharge recommendation:  Has been made in collaboration with the attending provider and/or case management    IF patient discharges home will need the following DME: to be determined (TBD)       SUBJECTIVE:   Patient stated You guys are hurting my back.  after sitting up EOB    OBJECTIVE DATA SUMMARY:   Critical Behavior:  Neurologic State: Alert,Confused  Orientation Level: Oriented to person,Oriented to place,Disoriented to time  Cognition: Decreased attention/concentration,Impaired decision making     Functional Mobility Training:  Bed Mobility:  Rolling: Moderate assistance;Assist x2  Supine to Sit: Maximum assistance;Assist x2  Sit to Supine: Maximum assistance;Assist x2  Scooting: Maximum assistance       Balance:  Sitting: Impaired; With support  Sitting - Static: Poor (constant support)  Sitting - Dynamic: Poor (constant support)      Therapeutic Exercises:   Declined participation    Pain Rating:  Back pain    Activity Tolerance:   Poor  Please refer to the flowsheet for vital signs taken during this treatment. After treatment patient left in no apparent distress:   Supine in bed, Call bell within reach, Bed / chair alarm activated and Side rails x 3    COMMUNICATION/COLLABORATION:   The patients plan of care was discussed with: Occupational therapy assistant. cotx due to A x2 and poor activity tolerance. Jordyn Garcia   Time Calculation: 14 mins         Problem: Mobility Impaired (Adult and Pediatric)  Goal: *Acute Goals and Plan of Care (Insert Text)  Description: Patient will move from supine to sit and sit to supine , scoot up and down, and roll side to side in bed with moderate assistance  within 7 day(s). Patient will transfer from bed to chair and chair to bed with moderate assistance  using the least restrictive device within 7 day(s). Patient will improve static sitting balance to supervision within 1 week(s).   Outcome: Progressing Towards Goal

## 2021-12-30 NOTE — PROGRESS NOTES
CM spoke with patient's son, Alicia Gracia, at nurses station. . Alicia Gracia asked about bringing patient home. CM explained that it could be done if patient had 24/7 care, he reported that him and his girlfriend have provided her with 24/7 care for a long time time and would be happy to have her come home then go to SNF, CM explained that this is unlikely able to happen and she would need to d/c from hospital to SNF, Mr. Alicia Gracia verbalized understanding. CM explained that we need more choices for referrals, SNF choice letter provided, he stated he would review it and speak with CM later in the day. CM explained that patient is ready for DC and would need insurance auth prior to d/c, he verbalized understanding. CM awaiting to hear from Mr. Alicia Gracia regarding other choices. 4:30 PM - CM followed up with patient's son. He stated that he just wants the best for patient and asked that referrals be sent to [facility], [facility], [facility], [facility], [facility], and [facility]. Referrals sent via gregoria. CM continues to follow.

## 2021-12-30 NOTE — PROGRESS NOTES
CM attempted to contact patient's son, Jesus Diggs [phone] & [phone], however no answer at this time, not able to leave . CM to follow up at a later time to obtain more SNF choices, patient declined by [facility]. Patient will need insurance auth prior to d/c.

## 2021-12-30 NOTE — PROGRESS NOTES
OCCUPATIONAL THERAPY TREATMENT  Patient: Manuel Monroe (33 y.o. female)  Date: 12/30/2021  Diagnosis: Hypertensive emergency [I16.1] <principal problem not specified>       Precautions:    Chart, occupational therapy assessment, plan of care, and goals were reviewed. ASSESSMENT  Patient continues with skilled OT services and is not progressing towards goals. Upon TABARES/PTA arrival, pt semi supine in bed and agreeable to tx session. Pt limited at this time with mobility due to confusion. Pt required cueing for maintaining focus/concentration to task at hand and required education for mobility. Pt completed rolling with ModA x2, supine<>sit with MaxA x2 and requiring constant support for sitting balance. Pt initially agreeable to complete OOB mobility; however, pt then became confused at EOB and complaining of being hungry and having back pain. Pt then not agreeable for sit>stand transfers. Pt also not willing to complete EOB therex and requested to return to supine. Pt noted with poor activity tolerance this date. Will continue to follow pt throughout remainder of stay and progress towards OT goals. Recommending SNF at discharge when medically appropriate. Current Level of Function Impacting Discharge (ADLs): ModA x2-MaxA x2 bed mobility    Other factors to consider for discharge: PLOF, home support, current level of assist         PLAN :  Patient continues to benefit from skilled intervention to address the above impairments. Continue treatment per established plan of care. to address goals.     Recommend next OT session: EOB/long sitting grooming    Recommendation for discharge: (in order for the patient to meet his/her long term goals)  Hilton Fountain    This discharge recommendation:  Has been made in collaboration with the attending provider and/or case management    IF patient discharges home will need the following DME: TBD       SUBJECTIVE:   Patient stated I am hungry.     OBJECTIVE DATA SUMMARY:   Cognitive/Behavioral Status:  Neurologic State: Alert;Confused  Orientation Level: Oriented to person;Oriented to place; Disoriented to time  Cognition: Decreased attention/concentration; Impaired decision making    Functional Mobility and Transfers for ADLs:  Bed Mobility:  Rolling: Moderate assistance;Assist x2  Supine to Sit: Maximum assistance;Assist x2  Sit to Supine: Maximum assistance;Assist x2  Scooting: Maximum assistance    Balance:  Sitting: Impaired; With support  Sitting - Static: Poor (constant support)  Sitting - Dynamic: Poor (constant support)    ADL Intervention:  Toileting  Bladder Hygiene: Total assistance (dependent)    Pain:  8-9 via FACES scale back pain    Activity Tolerance:   Poor  Please refer to the flowsheet for vital signs taken during this treatment. After treatment patient left in no apparent distress:   Supine in bed, Call bell within reach, Bed / chair alarm activated, and Side rails x 3    COMMUNICATION/COLLABORATION:   The patients plan of care was discussed with: Physical therapy assistant and Certified nursing assistant/patient care technician. Cotreat with PT for increased safety for pt and clinician. RAYSHAWN Taylor  Time Calculation: 15 mins    Problem: Self Care Deficits Care Plan (Adult)  Goal: *Acute Goals and Plan of Care (Insert Text)  Description: 1. Pt will be min A sup <> sit in prep for EOB ADLs  2. Pt will be min A grooming sitting EOB  3. Pt will be min A LE dressing sitting EOB/long sit  4. Pt will be mod A sit <>  prep for toileting LRAD  5. Pt will be mod A toileting/toilet transfer/cloth mgmt LRAD  6.   Pt will be mod A following UE HEP in prep for self care tasks     Outcome: Not Progressing Towards Goal

## 2021-12-31 PROCEDURE — 74011250636 HC RX REV CODE- 250/636: Performed by: INTERNAL MEDICINE

## 2021-12-31 PROCEDURE — 65270000029 HC RM PRIVATE

## 2021-12-31 PROCEDURE — 74011250637 HC RX REV CODE- 250/637: Performed by: FAMILY MEDICINE

## 2021-12-31 PROCEDURE — 74011250637 HC RX REV CODE- 250/637: Performed by: INTERNAL MEDICINE

## 2021-12-31 RX ADMIN — HYDRALAZINE HYDROCHLORIDE 50 MG: 50 TABLET, FILM COATED ORAL at 09:01

## 2021-12-31 RX ADMIN — LEVETIRACETAM 500 MG: 500 TABLET, FILM COATED ORAL at 22:30

## 2021-12-31 RX ADMIN — SODIUM CHLORIDE, PRESERVATIVE FREE 10 ML: 5 INJECTION INTRAVENOUS at 22:32

## 2021-12-31 RX ADMIN — SODIUM CHLORIDE, PRESERVATIVE FREE 10 ML: 5 INJECTION INTRAVENOUS at 05:22

## 2021-12-31 RX ADMIN — SODIUM CHLORIDE, PRESERVATIVE FREE 10 ML: 5 INJECTION INTRAVENOUS at 15:24

## 2021-12-31 RX ADMIN — OXYCODONE 5 MG: 5 TABLET ORAL at 22:33

## 2021-12-31 RX ADMIN — AMLODIPINE BESYLATE 5 MG: 5 TABLET ORAL at 22:30

## 2021-12-31 RX ADMIN — BISACODYL 5 MG: 5 TABLET, COATED ORAL at 15:18

## 2021-12-31 RX ADMIN — OXYCODONE 5 MG: 5 TABLET ORAL at 15:18

## 2021-12-31 RX ADMIN — LEVETIRACETAM 500 MG: 500 TABLET, FILM COATED ORAL at 09:01

## 2021-12-31 RX ADMIN — CLONIDINE HYDROCHLORIDE 0.2 MG: 0.2 TABLET ORAL at 09:01

## 2021-12-31 RX ADMIN — HYDRALAZINE HYDROCHLORIDE 50 MG: 50 TABLET, FILM COATED ORAL at 22:30

## 2021-12-31 RX ADMIN — AMLODIPINE BESYLATE 5 MG: 5 TABLET ORAL at 09:01

## 2021-12-31 RX ADMIN — CLONIDINE HYDROCHLORIDE 0.2 MG: 0.2 TABLET ORAL at 22:30

## 2021-12-31 RX ADMIN — ENOXAPARIN SODIUM 40 MG: 100 INJECTION SUBCUTANEOUS at 09:01

## 2021-12-31 NOTE — PROGRESS NOTES
Progress Note    Patient: Nick Yousif MRN: [id number]  SSN: [id number]    YOB: 1955  Age: 78 y.o. Sex: female      Admit Date: 12/22/2021    LOS: 9 days     Subjective:     78years old lady possible new onset seizures with metabolic encephalopathy    Patient is alert awake not in any distress    Objective:     Vitals:    12/30/21 1300 12/30/21 2024 12/31/21 0207 12/31/21 0808   BP:  (!) 153/93 (!) 168/99 (!) 186/105   Pulse:  63 (!) 56 99   Resp:  16 16 18   Temp:  97.9 °F (36.6 °C) 97.7 °F (36.5 °C) 98.3 °F (36.8 °C)   SpO2:  98% 100% 99%   Weight:       Height: 5' 4.02\" (1.626 m)           Intake and Output:  Current Shift: No intake/output data recorded. Last three shifts: 12/29 1901 - 12/31 0700  In: -   Out: 2200 [Urine:2200]    Physical Exam:   General:  Alert, cooperative, no distress, appears stated age. Eyes:  Conjunctivae/corneas clear. PERRL, EOMs intact. Fundi benign   Ears:  Normal TMs and external ear canals both ears. Nose: Nares normal. Septum midline. Mucosa normal. No drainage or sinus tenderness. Mouth/Throat: Lips, mucosa, and tongue normal. Teeth and gums normal.   Neck: Supple, symmetrical, trachea midline, no adenopathy, thyroid: no enlargment/tenderness/nodules, no carotid bruit and no JVD. Back:   Symmetric, no curvature. ROM normal. No CVA tenderness. Lungs:   Clear to auscultation bilaterally. Heart:  Regular rate and rhythm, S1, S2 normal, no murmur, click, rub or gallop. Abdomen:   Soft, non-tender. Bowel sounds normal. No masses,  No organomegaly. Extremities: Extremities normal, atraumatic, no cyanosis or edema. Pulses: 2+ and symmetric all extremities. Skin: Skin color, texture, turgor normal. No rashes or lesions   Lymph nodes: Cervical, supraclavicular, and axillary nodes normal.   Neurologic: CNII-XII intact. Normal strength, sensation and reflexes throughout. Lab/Data Review: All lab results for the last 24 hours reviewed. No results found for this or any previous visit (from the past 24 hour(s)).       Assessment:     Active Problems:    Hypertensive emergency (12/22/2021)    New onset seizure  Bradycardia  Thrombocytopenia  Hypertension    Plan:     Continue present work-up adjust medication for better blood pressure control    On amlodipine 5 mg twice a day  Clonidine 0.2 mg twice a day  Hydralazine 50 mg twice a day    Keppra 500 mg twice daily    Repeat the labs in the morning    PT OT consult    Discharge planning to SNF care    Signed By: Susu Story MD     December 31, 2021

## 2021-12-31 NOTE — PROGRESS NOTES
Problem: Pressure Injury - Risk of  Goal: *Prevention of pressure injury  Description: Document Baldo Scale and appropriate interventions in the flowsheet. Outcome: Progressing Towards Goal  Note: Pressure Injury Interventions:  Sensory Interventions: Assess changes in LOC    Moisture Interventions: Absorbent underpads,Apply protective barrier, creams and emollients,Assess need for specialty bed    Activity Interventions: PT/OT evaluation    Mobility Interventions: HOB 30 degrees or less    Nutrition Interventions: Document food/fluid/supplement intake    Friction and Shear Interventions: Apply protective barrier, creams and emollients                Problem: Patient Education: Go to Patient Education Activity  Goal: Patient/Family Education  Outcome: Progressing Towards Goal     Problem: Falls - Risk of  Goal: *Absence of Falls  Description: Document Haven Fall Risk and appropriate interventions in the flowsheet.   Outcome: Progressing Towards Goal  Note: Fall Risk Interventions:       Mentation Interventions: Adequate sleep, hydration, pain control    Medication Interventions: Bed/chair exit alarm    Elimination Interventions: Call light in reach,Bed/chair exit alarm              Problem: Patient Education: Go to Patient Education Activity  Goal: Patient/Family Education  Outcome: Progressing Towards Goal     Problem: Patient Education: Go to Patient Education Activity  Goal: Patient/Family Education  Outcome: Progressing Towards Goal     Problem: Patient Education: Go to Patient Education Activity  Goal: Patient/Family Education  Outcome: Progressing Towards Goal

## 2022-01-01 PROCEDURE — 74011250637 HC RX REV CODE- 250/637: Performed by: FAMILY MEDICINE

## 2022-01-01 PROCEDURE — 74011250637 HC RX REV CODE- 250/637: Performed by: INTERNAL MEDICINE

## 2022-01-01 PROCEDURE — 65270000029 HC RM PRIVATE

## 2022-01-01 PROCEDURE — 74011250636 HC RX REV CODE- 250/636: Performed by: INTERNAL MEDICINE

## 2022-01-01 RX ADMIN — AMLODIPINE BESYLATE 5 MG: 5 TABLET ORAL at 11:20

## 2022-01-01 RX ADMIN — HYDRALAZINE HYDROCHLORIDE 50 MG: 50 TABLET, FILM COATED ORAL at 21:09

## 2022-01-01 RX ADMIN — SODIUM CHLORIDE, PRESERVATIVE FREE 10 ML: 5 INJECTION INTRAVENOUS at 06:35

## 2022-01-01 RX ADMIN — LEVETIRACETAM 500 MG: 500 TABLET, FILM COATED ORAL at 21:09

## 2022-01-01 RX ADMIN — OXYCODONE 5 MG: 5 TABLET ORAL at 17:09

## 2022-01-01 RX ADMIN — AMLODIPINE BESYLATE 5 MG: 5 TABLET ORAL at 21:09

## 2022-01-01 RX ADMIN — HYDRALAZINE HYDROCHLORIDE 50 MG: 50 TABLET, FILM COATED ORAL at 11:12

## 2022-01-01 RX ADMIN — LEVETIRACETAM 500 MG: 500 TABLET, FILM COATED ORAL at 11:12

## 2022-01-01 RX ADMIN — OXYCODONE 5 MG: 5 TABLET ORAL at 21:09

## 2022-01-01 RX ADMIN — SODIUM CHLORIDE, PRESERVATIVE FREE 10 ML: 5 INJECTION INTRAVENOUS at 17:09

## 2022-01-01 RX ADMIN — SODIUM CHLORIDE, PRESERVATIVE FREE 10 ML: 5 INJECTION INTRAVENOUS at 21:10

## 2022-01-01 RX ADMIN — BISACODYL 5 MG: 5 TABLET, COATED ORAL at 17:09

## 2022-01-01 RX ADMIN — CLONIDINE HYDROCHLORIDE 0.2 MG: 0.2 TABLET ORAL at 21:09

## 2022-01-01 RX ADMIN — OXYCODONE 5 MG: 5 TABLET ORAL at 11:12

## 2022-01-01 RX ADMIN — CLONIDINE HYDROCHLORIDE 0.2 MG: 0.2 TABLET ORAL at 11:12

## 2022-01-01 NOTE — PROGRESS NOTES
Progress Note    Patient: Karli Wilson MRN: [id number]  SSN: [id number]    YOB: 1955  Age: 78 y.o. Sex: female      Admit Date: 12/22/2021    LOS: 10 days     Subjective:     78years old lady possible new onset seizures with metabolic encephalopathy    Patient is alert awake not in any distress    Objective:     Vitals:    12/31/21 1514 12/31/21 1848 12/31/21 2227 01/01/22 0746   BP: 111/70 123/70 129/72 (!) 157/95   Pulse: (!) 104 62 63 (!) 51   Resp: 18 18 20 18   Temp: 98.2 °F (36.8 °C) 98.2 °F (36.8 °C) 98.9 °F (37.2 °C) 98.1 °F (36.7 °C)   SpO2: 99% 99% 98% 98%   Weight:       Height:            Intake and Output:  Current Shift: No intake/output data recorded. Last three shifts: 12/30 1901 - 01/01 0700  In: 600 [P.O.:600]  Out: 1875 [Urine:1875]    Physical Exam:   General:  Alert, cooperative, no distress, appears stated age. Eyes:  Conjunctivae/corneas clear. PERRL, EOMs intact. Fundi benign   Ears:  Normal TMs and external ear canals both ears. Nose: Nares normal. Septum midline. Mucosa normal. No drainage or sinus tenderness. Mouth/Throat: Lips, mucosa, and tongue normal. Teeth and gums normal.   Neck: Supple, symmetrical, trachea midline, no adenopathy, thyroid: no enlargment/tenderness/nodules, no carotid bruit and no JVD. Back:   Symmetric, no curvature. ROM normal. No CVA tenderness. Lungs:   Clear to auscultation bilaterally. Heart:  Regular rate and rhythm, S1, S2 normal, no murmur, click, rub or gallop. Abdomen:   Soft, non-tender. Bowel sounds normal. No masses,  No organomegaly. Extremities: Extremities normal, atraumatic, no cyanosis or edema. Pulses: 2+ and symmetric all extremities. Skin: Skin color, texture, turgor normal. No rashes or lesions   Lymph nodes: Cervical, supraclavicular, and axillary nodes normal.   Neurologic: CNII-XII intact. Normal strength, sensation and reflexes throughout. Lab/Data Review:   All lab results for the last 24 hours reviewed. No results found for this or any previous visit (from the past 24 hour(s)).       Assessment:     Active Problems:    Hypertensive emergency (12/22/2021)    New onset seizure  Bradycardia  Thrombocytopenia  Hypertension    Plan:     Continue present work-up adjust medication for better blood pressure control    On amlodipine 5 mg twice a day  Clonidine 0.2 mg twice a day  Hydralazine 50 mg twice a day    Keppra 500 mg twice daily    Repeat the labs in the morning    PT OT consult    Discharge planning to SNF care    Signed By: Marsha Henriquez MD     January 1, 2022

## 2022-01-02 PROCEDURE — 74011000250 HC RX REV CODE- 250: Performed by: INTERNAL MEDICINE

## 2022-01-02 PROCEDURE — 74011250637 HC RX REV CODE- 250/637: Performed by: FAMILY MEDICINE

## 2022-01-02 PROCEDURE — 74011250636 HC RX REV CODE- 250/636: Performed by: INTERNAL MEDICINE

## 2022-01-02 PROCEDURE — 65270000029 HC RM PRIVATE

## 2022-01-02 PROCEDURE — 74011250637 HC RX REV CODE- 250/637: Performed by: INTERNAL MEDICINE

## 2022-01-02 RX ORDER — SORBITOL SOLUTION 70 %
30 SOLUTION, ORAL MISCELLANEOUS DAILY PRN
Status: DISCONTINUED | OUTPATIENT
Start: 2022-01-02 | End: 2022-01-07 | Stop reason: HOSPADM

## 2022-01-02 RX ADMIN — LEVETIRACETAM 500 MG: 500 TABLET, FILM COATED ORAL at 21:41

## 2022-01-02 RX ADMIN — OXYCODONE 5 MG: 5 TABLET ORAL at 21:41

## 2022-01-02 RX ADMIN — HYDRALAZINE HYDROCHLORIDE 50 MG: 50 TABLET, FILM COATED ORAL at 09:41

## 2022-01-02 RX ADMIN — SODIUM CHLORIDE, PRESERVATIVE FREE 10 ML: 5 INJECTION INTRAVENOUS at 04:34

## 2022-01-02 RX ADMIN — SODIUM CHLORIDE, PRESERVATIVE FREE 10 ML: 5 INJECTION INTRAVENOUS at 21:41

## 2022-01-02 RX ADMIN — CLONIDINE HYDROCHLORIDE 0.2 MG: 0.2 TABLET ORAL at 09:41

## 2022-01-02 RX ADMIN — LEVETIRACETAM 500 MG: 500 TABLET, FILM COATED ORAL at 09:41

## 2022-01-02 RX ADMIN — CLONIDINE HYDROCHLORIDE 0.2 MG: 0.2 TABLET ORAL at 21:40

## 2022-01-02 RX ADMIN — SODIUM CHLORIDE, PRESERVATIVE FREE 10 ML: 5 INJECTION INTRAVENOUS at 15:27

## 2022-01-02 RX ADMIN — AMLODIPINE BESYLATE 5 MG: 5 TABLET ORAL at 21:41

## 2022-01-02 RX ADMIN — HYDRALAZINE HYDROCHLORIDE 10 MG: 20 INJECTION INTRAMUSCULAR; INTRAVENOUS at 02:52

## 2022-01-02 RX ADMIN — AMLODIPINE BESYLATE 5 MG: 5 TABLET ORAL at 09:41

## 2022-01-02 RX ADMIN — HYDRALAZINE HYDROCHLORIDE 50 MG: 50 TABLET, FILM COATED ORAL at 21:41

## 2022-01-02 RX ADMIN — OXYCODONE 5 MG: 5 TABLET ORAL at 09:41

## 2022-01-02 RX ADMIN — SORBITOL SOLUTION (BULK) 30 ML: 70 SOLUTION at 15:46

## 2022-01-02 NOTE — PROGRESS NOTES
Progress Note    Patient: Rukhsana Darling MRN: [id number]  SSN: [id number]    YOB: 1955  Age: 78 y.o. Sex: female      Admit Date: 12/22/2021    LOS: 11 days     Subjective:     78years old lady possible new onset seizures with metabolic encephalopathy    Patient is alert awake not in any distress    Objective:     Vitals:    01/01/22 1540 01/01/22 1936 01/02/22 0047 01/02/22 0758   BP: 111/74 125/78 (!) 161/99 (!) 142/90   Pulse: 60 (!) 59 60 (!) 55   Resp: 18 18 18 18   Temp: 98 °F (36.7 °C) 98.7 °F (37.1 °C) 97.8 °F (36.6 °C) 98.1 °F (36.7 °C)   SpO2: 100% 97% 100% 100%   Weight:       Height:            Intake and Output:  Current Shift: No intake/output data recorded. Last three shifts: 12/31 1901 - 01/02 0700  In: 100 [P.O.:100]  Out: 1850 [Urine:1850]    Physical Exam:   General:  Alert, cooperative, no distress, appears stated age. Eyes:  Conjunctivae/corneas clear. PERRL, EOMs intact. Fundi benign   Ears:  Normal TMs and external ear canals both ears. Nose: Nares normal. Septum midline. Mucosa normal. No drainage or sinus tenderness. Mouth/Throat: Lips, mucosa, and tongue normal. Teeth and gums normal.   Neck: Supple, symmetrical, trachea midline, no adenopathy, thyroid: no enlargment/tenderness/nodules, no carotid bruit and no JVD. Back:   Symmetric, no curvature. ROM normal. No CVA tenderness. Lungs:   Clear to auscultation bilaterally. Heart:  Regular rate and rhythm, S1, S2 normal, no murmur, click, rub or gallop. Abdomen:   Soft, non-tender. Bowel sounds normal. No masses,  No organomegaly. Extremities: Extremities normal, atraumatic, no cyanosis or edema. Pulses: 2+ and symmetric all extremities. Skin: Skin color, texture, turgor normal. No rashes or lesions   Lymph nodes: Cervical, supraclavicular, and axillary nodes normal.   Neurologic: CNII-XII intact. Normal strength, sensation and reflexes throughout. Lab/Data Review:   All lab results for the last 24 hours reviewed. No results found for this or any previous visit (from the past 24 hour(s)).       Assessment:     Active Problems:    Hypertensive emergency (12/22/2021)    New onset seizure  Bradycardia  Thrombocytopenia  Hypertension    Plan:     Continue present work-up adjust medication for better blood pressure control    On amlodipine 5 mg twice a day  Clonidine 0.2 mg twice a day  Hydralazine 50 mg twice a day    Keppra 500 mg twice daily    Repeat the labs in the morning    PT OT consult    Discharge planning to SNF care    Signed By: Parnell Opitz, MD     January 2, 2022

## 2022-01-03 LAB
ALBUMIN SERPL-MCNC: 2.6 G/DL (ref 3.5–5)
ALBUMIN/GLOB SERPL: 0.7 {RATIO} (ref 1.1–2.2)
ALP SERPL-CCNC: 48 U/L (ref 45–117)
ALT SERPL-CCNC: 14 U/L (ref 12–78)
ANION GAP SERPL CALC-SCNC: 9 MMOL/L (ref 5–15)
AST SERPL W P-5'-P-CCNC: 20 U/L (ref 15–37)
BILIRUB SERPL-MCNC: 0.3 MG/DL (ref 0.2–1)
BUN SERPL-MCNC: 40 MG/DL (ref 6–20)
BUN/CREAT SERPL: 44 (ref 12–20)
CA-I BLD-MCNC: 9 MG/DL (ref 8.5–10.1)
CHLORIDE SERPL-SCNC: 111 MMOL/L (ref 97–108)
CO2 SERPL-SCNC: 20 MMOL/L (ref 21–32)
CREAT SERPL-MCNC: 0.9 MG/DL (ref 0.55–1.02)
ERYTHROCYTE [DISTWIDTH] IN BLOOD BY AUTOMATED COUNT: 14.2 % (ref 11.5–14.5)
GLOBULIN SER CALC-MCNC: 3.6 G/DL (ref 2–4)
GLUCOSE SERPL-MCNC: 95 MG/DL (ref 65–100)
HCT VFR BLD AUTO: 28.9 % (ref 35–47)
HGB BLD-MCNC: 9.2 G/DL (ref 11.5–16)
MCH RBC QN AUTO: 33.7 PG (ref 26–34)
MCHC RBC AUTO-ENTMCNC: 31.8 G/DL (ref 30–36.5)
MCV RBC AUTO: 105.9 FL (ref 80–99)
NRBC # BLD: 0 K/UL (ref 0–0.01)
NRBC BLD-RTO: 0 PER 100 WBC
PLATELET # BLD AUTO: 86 K/UL (ref 150–400)
POTASSIUM SERPL-SCNC: 4.8 MMOL/L (ref 3.5–5.1)
PROT SERPL-MCNC: 6.2 G/DL (ref 6.4–8.2)
RBC # BLD AUTO: 2.73 M/UL (ref 3.8–5.2)
SODIUM SERPL-SCNC: 140 MMOL/L (ref 136–145)
WBC # BLD AUTO: 7.2 K/UL (ref 3.6–11)

## 2022-01-03 PROCEDURE — 36415 COLL VENOUS BLD VENIPUNCTURE: CPT

## 2022-01-03 PROCEDURE — 80053 COMPREHEN METABOLIC PANEL: CPT

## 2022-01-03 PROCEDURE — 65270000029 HC RM PRIVATE

## 2022-01-03 PROCEDURE — 85027 COMPLETE CBC AUTOMATED: CPT

## 2022-01-03 PROCEDURE — 74011250636 HC RX REV CODE- 250/636: Performed by: INTERNAL MEDICINE

## 2022-01-03 PROCEDURE — 74011250637 HC RX REV CODE- 250/637: Performed by: FAMILY MEDICINE

## 2022-01-03 PROCEDURE — 74011250637 HC RX REV CODE- 250/637: Performed by: INTERNAL MEDICINE

## 2022-01-03 PROCEDURE — 74011000250 HC RX REV CODE- 250: Performed by: INTERNAL MEDICINE

## 2022-01-03 RX ADMIN — CLONIDINE HYDROCHLORIDE 0.2 MG: 0.2 TABLET ORAL at 22:22

## 2022-01-03 RX ADMIN — LEVETIRACETAM 500 MG: 500 TABLET, FILM COATED ORAL at 22:22

## 2022-01-03 RX ADMIN — OXYCODONE 5 MG: 5 TABLET ORAL at 22:22

## 2022-01-03 RX ADMIN — SODIUM CHLORIDE, PRESERVATIVE FREE 10 ML: 5 INJECTION INTRAVENOUS at 05:46

## 2022-01-03 RX ADMIN — CLONIDINE HYDROCHLORIDE 0.2 MG: 0.2 TABLET ORAL at 09:02

## 2022-01-03 RX ADMIN — AMLODIPINE BESYLATE 5 MG: 5 TABLET ORAL at 22:22

## 2022-01-03 RX ADMIN — OXYCODONE 5 MG: 5 TABLET ORAL at 13:51

## 2022-01-03 RX ADMIN — ENOXAPARIN SODIUM 40 MG: 100 INJECTION SUBCUTANEOUS at 09:02

## 2022-01-03 RX ADMIN — HYDRALAZINE HYDROCHLORIDE 50 MG: 50 TABLET, FILM COATED ORAL at 09:02

## 2022-01-03 RX ADMIN — HYDRALAZINE HYDROCHLORIDE 50 MG: 50 TABLET, FILM COATED ORAL at 22:22

## 2022-01-03 RX ADMIN — LEVETIRACETAM 500 MG: 500 TABLET, FILM COATED ORAL at 09:02

## 2022-01-03 RX ADMIN — SODIUM CHLORIDE, PRESERVATIVE FREE 10 ML: 5 INJECTION INTRAVENOUS at 13:41

## 2022-01-03 RX ADMIN — AMLODIPINE BESYLATE 5 MG: 5 TABLET ORAL at 09:02

## 2022-01-03 RX ADMIN — OXYCODONE 5 MG: 5 TABLET ORAL at 09:02

## 2022-01-03 NOTE — PROGRESS NOTES
Progress Note    Patient: Belia Rocha MRN: [id number]  SSN: [id number]    YOB: 1955  Age: 78 y.o. Sex: female      Admit Date: 12/22/2021    LOS: 12 days     Subjective:     78years old lady possible new onset seizures with metabolic encephalopathy    Patient is alert awake not in any distress    Objective:     Vitals:    01/02/22 0758 01/02/22 1539 01/02/22 2326 01/03/22 0854   BP: (!) 142/90 124/71 (!) 152/71 (!) 156/98   Pulse: (!) 55 72 72 (!) 52   Resp: 18 18 18 20   Temp: 98.1 °F (36.7 °C) 98.7 °F (37.1 °C) 98.6 °F (37 °C) 98.3 °F (36.8 °C)   SpO2: 100% 97% 95% 97%   Weight:       Height:            Intake and Output:  Current Shift: 01/03 0701 - 01/03 1900  In: 180 [P.O.:180]  Out: -   Last three shifts: 01/01 1901 - 01/03 0700  In: 500 [P.O.:500]  Out: 2550 [Urine:2550]    Physical Exam:   General:  Alert, cooperative, no distress, appears stated age. Eyes:  Conjunctivae/corneas clear. PERRL, EOMs intact. Fundi benign   Ears:  Normal TMs and external ear canals both ears. Nose: Nares normal. Septum midline. Mucosa normal. No drainage or sinus tenderness. Mouth/Throat: Lips, mucosa, and tongue normal. Teeth and gums normal.   Neck: Supple, symmetrical, trachea midline, no adenopathy, thyroid: no enlargment/tenderness/nodules, no carotid bruit and no JVD. Back:   Symmetric, no curvature. ROM normal. No CVA tenderness. Lungs:   Clear to auscultation bilaterally. Heart:  Regular rate and rhythm, S1, S2 normal, no murmur, click, rub or gallop. Abdomen:   Soft, non-tender. Bowel sounds normal. No masses,  No organomegaly. Extremities: Extremities normal, atraumatic, no cyanosis or edema. Pulses: 2+ and symmetric all extremities. Skin: Skin color, texture, turgor normal. No rashes or lesions   Lymph nodes: Cervical, supraclavicular, and axillary nodes normal.   Neurologic: CNII-XII intact. Normal strength, sensation and reflexes throughout. Lab/Data Review:   All lab results for the last 24 hours reviewed. Recent Results (from the past 24 hour(s))   CBC W/O DIFF    Collection Time: 01/03/22 11:27 AM   Result Value Ref Range    WBC 7.2 3.6 - 11.0 K/uL    RBC 2.73 (L) 3.80 - 5.20 M/uL    HGB 9.2 (L) 11.5 - 16.0 g/dL    HCT 28.9 (L) 35.0 - 47.0 %    .9 (H) 80.0 - 99.0 FL    MCH 33.7 26.0 - 34.0 PG    MCHC 31.8 30.0 - 36.5 g/dL    RDW 14.2 11.5 - 14.5 %    PLATELET 86 (L) 288 - 400 K/uL    NRBC 0.0 0.0  WBC    ABSOLUTE NRBC 0.00 0.00 - 9.85 K/uL   METABOLIC PANEL, COMPREHENSIVE    Collection Time: 01/03/22 11:27 AM   Result Value Ref Range    Sodium 140 136 - 145 mmol/L    Potassium 4.8 3.5 - 5.1 mmol/L    Chloride 111 (H) 97 - 108 mmol/L    CO2 20 (L) 21 - 32 mmol/L    Anion gap 9 5 - 15 mmol/L    Glucose 95 65 - 100 mg/dL    BUN 40 (H) 6 - 20 mg/dL    Creatinine 0.90 0.55 - 1.02 mg/dL    BUN/Creatinine ratio 44 (H) 12 - 20      GFR est AA >60 >60 ml/min/1.73m2    GFR est non-AA >60 >60 ml/min/1.73m2    Calcium 9.0 8.5 - 10.1 mg/dL    Bilirubin, total 0.3 0.2 - 1.0 mg/dL    AST (SGOT) 20 15 - 37 U/L    ALT (SGPT) 14 12 - 78 U/L    Alk.  phosphatase 48 45 - 117 U/L    Protein, total 6.2 (L) 6.4 - 8.2 g/dL    Albumin 2.6 (L) 3.5 - 5.0 g/dL    Globulin 3.6 2.0 - 4.0 g/dL    A-G Ratio 0.7 (L) 1.1 - 2.2           Assessment:     Active Problems:    Hypertensive emergency (12/22/2021)    New onset seizure  Bradycardia  Thrombocytopenia  Hypertension    Plan:     Continue present work-up adjust medication for better blood pressure control    On amlodipine 5 mg twice a day  Clonidine 0.2 mg twice a day  Hydralazine 50 mg twice a day    Keppra 500 mg twice daily    Repeat the labs in the morning    PT OT consult    Discharge planning to SNF care    Signed By: Christian Kim MD     January 3, 2022

## 2022-01-03 NOTE — PROGRESS NOTES
Chart reviewed. Of the choices provided by the patients son, Rosa Corley, the only facility that is accepting the patient pending [place] [place] is [facility] [address]. They will need to initiate auth which will require updated PT and OT notes.  CM will notify the therapy team. Ok to take both the Aldactone and OCP together    Electronically Signed by: Mahin Avalos MD, 9/13/2021

## 2022-01-04 PROCEDURE — 74011250636 HC RX REV CODE- 250/636: Performed by: INTERNAL MEDICINE

## 2022-01-04 PROCEDURE — 74011250637 HC RX REV CODE- 250/637: Performed by: FAMILY MEDICINE

## 2022-01-04 PROCEDURE — 97530 THERAPEUTIC ACTIVITIES: CPT

## 2022-01-04 PROCEDURE — 65270000029 HC RM PRIVATE

## 2022-01-04 PROCEDURE — 74011250637 HC RX REV CODE- 250/637: Performed by: INTERNAL MEDICINE

## 2022-01-04 RX ADMIN — OXYCODONE 5 MG: 5 TABLET ORAL at 11:13

## 2022-01-04 RX ADMIN — HYDRALAZINE HYDROCHLORIDE 50 MG: 50 TABLET, FILM COATED ORAL at 20:38

## 2022-01-04 RX ADMIN — AMLODIPINE BESYLATE 5 MG: 5 TABLET ORAL at 20:38

## 2022-01-04 RX ADMIN — LEVETIRACETAM 500 MG: 500 TABLET, FILM COATED ORAL at 09:24

## 2022-01-04 RX ADMIN — CLONIDINE HYDROCHLORIDE 0.2 MG: 0.2 TABLET ORAL at 20:38

## 2022-01-04 RX ADMIN — LEVETIRACETAM 500 MG: 500 TABLET, FILM COATED ORAL at 20:39

## 2022-01-04 RX ADMIN — ENOXAPARIN SODIUM 40 MG: 100 INJECTION SUBCUTANEOUS at 09:24

## 2022-01-04 RX ADMIN — AMLODIPINE BESYLATE 5 MG: 5 TABLET ORAL at 09:24

## 2022-01-04 RX ADMIN — HYDRALAZINE HYDROCHLORIDE 50 MG: 50 TABLET, FILM COATED ORAL at 09:24

## 2022-01-04 RX ADMIN — CLONIDINE HYDROCHLORIDE 0.2 MG: 0.2 TABLET ORAL at 09:24

## 2022-01-04 NOTE — PROGRESS NOTES
NEURO PROGRESS NOTE        SUBJECTIVE:   Seizures   Status changes      EXAM:  Morning but lethargic. Tends to respond appropriately. No seizures reported  Moves extremities      ASSESSMENT/PLAN:  Continue current antiseizure medication, Keppra    ALLERGIES:    No Known Allergies    MEDS:      Current Facility-Administered Medications:     sorbitoL 70 % solution 30 mL, 30 mL, Oral, DAILY PRN, Sharon Ring MD, 30 mL at 01/02/22 1546    hydrALAZINE (APRESOLINE) tablet 50 mg, 50 mg, Oral, BID, Sharon Ring MD, 50 mg at 01/04/22 0924    levETIRAcetam (KEPPRA) tablet 500 mg, 500 mg, Oral, BID, Sharon Ring MD, 500 mg at 01/04/22 0924    enoxaparin (LOVENOX) injection 40 mg, 40 mg, SubCUTAneous, Q24H, Anderson Barnard MD, 40 mg at 01/04/22 1149    cloNIDine HCL (CATAPRES) tablet 0.2 mg, 0.2 mg, Oral, BID, Anderson Barnard MD, 0.2 mg at 01/04/22 0924    amLODIPine (NORVASC) tablet 5 mg, 5 mg, Oral, BID, Anderson Barnard MD, 5 mg at 01/04/22 2991    hydrALAZINE (APRESOLINE) 20 mg/mL injection 10 mg, 10 mg, IntraVENous, Q6H PRN, Anderson Elmore MD, 10 mg at 01/02/22 0252    sodium chloride (NS) flush 5-40 mL, 5-40 mL, IntraVENous, PRN, Anderson Barnard MD    LORazepam (ATIVAN) injection 1 mg, 1 mg, IntraVENous, Q4H PRN, Anderson Elmore MD    oxyCODONE IR (ROXICODONE) tablet 5 mg, 5 mg, Oral, Q4H PRN, Anderson Elmore MD, 5 mg at 01/04/22 1113    bisacodyL (DULCOLAX) tablet 5 mg, 5 mg, Oral, DAILY PRN, Linh BIRD MD, 5 mg at 01/01/22 1709    LABS:  No results found for this or any previous visit (from the past 24 hour(s)). Visit Vitals  BP (!) 164/95   Pulse (!) 55   Temp 97.7 °F (36.5 °C)   Resp 18   Ht 5' 4.02\" (1.626 m)   Wt 52.2 kg (115 lb)   SpO2 100%   BMI 19.73 kg/m²       Imaging:  MRI BRAIN WO CONT   Final Result   1. No acute intracranial abnormality evident. 2. Extensive high T2 signal throughout the white matter is evident.    3. Stable cystic encephalomalacia right parietal lobe. 4. Generalized cerebral atrophy. No significant change from previous CT   examination of 3/21/2021. CT CODE NEURO HEAD WO CONTRAST   Final Result   No acute intracranial process. Other findings as above.       Findings conveyed to Dr. Elvis Da Silva on 12/22/2021 at 4:56 PM.

## 2022-01-04 NOTE — PROGRESS NOTES
BULL received call from patient's son Ella Foreman, [phone], regarding the patient's discharge plan. He stated he wants the patient to go to [facility]. BULL explained that there are two barriers. 1) The patient is not functioning at a level appropriate for IRF and 2) [employer] is not going to authorize that based on the patient's level of functioning and recommendations for SNF. Patient's son acknowledged his understanding of this. We then reviewed all of the Skilled Nursing Facilities that referrals had been sent to. BULL explained that at this time the only facility that has accepted the patient was [facility]. He stated he would absolutely not choose that facility. BULL explained that all other facilities had declined except for [address] [place] [address] would not consider the patient unless she was screened for Medicaid. Mr. Alta Nixon stated he was agreeable for a medicaid screening. Public Benefits has been notified of request. He is agreeable for [facility] if they will accept. BULL has reached out to [facility] and awaiting response.

## 2022-01-04 NOTE — PROGRESS NOTES
OCCUPATIONAL THERAPY RE-ASSESSMENT  Patient: Rudi Rae (23 y.o. female)  Date: 1/4/2022  Diagnosis: Hypertensive emergency [I16.1] <principal problem not specified>       Precautions: fall risk   Chart, occupational therapy assessment, plan of care, and goals were reviewed. ASSESSMENT  Pt is a 79 y/o F with PMH of HTN  presenting to [facility] with c/o seizure, admitted 12/22/21 and being treated for a hypertensive emergency. Pt received semi-supine in bed upon arrival, AXO to self and place, disoriented to time (re-oriented by OT), agreeable to working with OT at this time. Pt was initially evaluated and placed on OT caseload on 12/26/21, has been seen for 2 skilled OT tx sessions since that time, now due for RA 2' to LOS. Per initial OT evaluation, pt lives with son in a one-story home. Per chart review, pt requires assist with ADLs and is Mod I using wheelchair and RW for mobility at [facility]. Based on current observations, pt continues to present with deficits in generalized strength/AROM, static/dynamic sitting balance, bed mobility, functional activity tolerance, coordination, cognition/confusion, and increased pain impacting overall performance of ADLs and functional transfers/mobility. Pt currently requires max A rolling, max A supine><sit to EOB with rigid posture; pt tolerating appox 30 seconds with max A for supported sitting 2' posterior lean. Pt expresses fear of falling, unable to progress to standing today therefore returned to supine for remainder of session. Pt noted to have wet chux, total A bladder hygiene completed by RN at bedside and wedge pillow to R side for offloading (clean chux provided following toileting routine). Pt requires total A basic grooming (washing face) due BUE AROM limitations and total A donning socks in long sitting.  Overall, pt tolerates session fair today and would benefit from continued skilled OT services to address noted deficits and maximize IND/safety with ADLs and functional transfers/mobility. OT goals and POC reviewed and continue to remain appropriate at this time. Continue to progress. OT recommending SNF at discharge once medically appropriate. Other factors to consider for discharge: family support, DME, time since onset, severity of deficits, functional baseline          PLAN :  Recommendations and Planned Interventions: self care training, functional mobility training, therapeutic exercise, balance training, therapeutic activities, cognitive retraining, endurance activities, patient education, and family training/education    Recommend with staff: reposition frequently to prevent skin breakdown    Recommend next OT session: Increase tolerance seated EOB in prep for ADL    Frequency/Duration: Patient will be followed by occupational therapy:  2-3x/week to address goals. Recommendation for discharge: (in order for the patient to meet his/her long term goals)  [facility]    This discharge recommendation:  Has been made in collaboration with the attending provider and/or case management       SUBJECTIVE:   Patient stated my back is hurting today.     OBJECTIVE DATA SUMMARY:       Cognitive/Behavioral Status:  Neurologic State: Alert  Orientation Level: Oriented to person;Oriented to place; Disoriented to time  Cognition: Follows commands;Decreased attention/concentration       Range of Motion:  AROM: Grossly decreased, non-functional  PROM: Grossly decreased, non-functional                      Strength:  Strength: Grossly decreased, non-functional                Coordination:  Coordination: Grossly decreased, non-functional  Fine Motor Skills-Upper: Left Impaired;Right Impaired    Gross Motor Skills-Upper: Left Impaired;Right Impaired    Tone & Sensation:  Tone: Abnormal (hypertonic)                           Functional Mobility and Transfers for ADLs:  Bed Mobility:  Rolling: Maximum assistance  Supine to Sit: Maximum assistance  Sit to Supine: Maximum assistance      Balance:  Sitting: Impaired; With support  Sitting - Static: Poor (constant support)    ADL Intervention and task modifications:  Feeding  Container Management: Total assistance (dependent)    Grooming  Grooming Assistance: Total assistance(dependent)  Position Performed: Long sitting on bed  Washing Face: Total assistance (dependent)                   Lower Body Dressing Assistance  Socks: Total assistance (dependent)  Position Performed: Long sitting on bed    Toileting  Toileting Assistance: Total assistance(dependent)  Bladder Hygiene: Total assistance (dependent)         Therapeutic Exercises:   Pt may benefit from UE HEP to improve overall UE AROM/strength and can be further educated in next treatment session. Functional Measure:    [facility] AM-PACTM \"6 Clicks\"                                                       Daily Activity Inpatient Short Form  How much help from another person does the patient currently need. .. Total; A Lot A Little None   1. Putting on and taking off regular lower body clothing? [x]  1 []  2 []  3 []  4   2. Bathing (including washing, rinsing, drying)? [x]  1 []  2 []  3 []  4   3. Toileting, which includes using toilet, bedpan or urinal? [x] 1 []  2 []  3 []  4   4. Putting on and taking off regular upper body clothing? [x]  1 []  2 []  3 []  4   5. Taking care of personal grooming such as brushing teeth? [x]  1 []  2 []  3 []  4   6. Eating meals? [x]  1 []  2 []  3 []  4   © 2007, Trustees of [facility], under license to [employer]. All rights reserved     Score: 6/24     Interpretation of Tool:  Represents clinically-significant functional categories (i.e. Activities of daily living).   Percentage of Impairment CH    0%   CI    1-19% CJ    20-39% CK    40-59% CL    60-79% CM    80-99% CN     100%   AMPAC  Score 6-24 24 23 20-22 15-19 10-14 7-9 6        Pain:  7/10 back (RN aware and provided medication during session)    Activity Tolerance:   Fair    After treatment patient left in no apparent distress:   Supine in bed, Heels elevated for pressure relief, Patient positioned in sidelying for pressure relief, Call bell within reach, Bed / chair alarm activated, and Side rails x 3    COMMUNICATION/COLLABORATION:   The patients plan of care was discussed with: Registered nurse. Weston Barthel  Time Calculation: 29 mins   Problem: Self Care Deficits Care Plan (Adult)  Goal: *Acute Goals and Plan of Care (Insert Text)  Description: 1. Pt will be min A sup <> sit in prep for EOB ADLs  2. Pt will be min A grooming sitting EOB  3. Pt will be min A LE dressing sitting EOB/long sit  4. Pt will be mod A sit <>  prep for toileting LRAD  5. Pt will be mod A toileting/toilet transfer/cloth mgmt LRAD  6.   Pt will be mod A following UE HEP in prep for self care tasks     Outcome: Progressing Towards Goal

## 2022-01-04 NOTE — PROGRESS NOTES
Progress Note    Patient: Nick Yousif MRN: [id number]  SSN: [id number]    YOB: 1955  Age: 78 y.o. Sex: female      Admit Date: 12/22/2021    LOS: 13 days     Subjective:     78years old lady possible new onset seizures with metabolic encephalopathy    Patient is alert awake not in any distress    Objective:     Vitals:    01/03/22 1742 01/03/22 2220 01/04/22 0126 01/04/22 0941   BP: 115/72 132/87 (!) 160/87 (!) 164/95   Pulse: 61 62 (!) 58 (!) 55   Resp: 18 17 16 18   Temp: 99 °F (37.2 °C) 98.3 °F (36.8 °C) 98.3 °F (36.8 °C) 97.7 °F (36.5 °C)   SpO2: 99% 100% 97% 100%   Weight:       Height:            Intake and Output:  Current Shift: 01/04 0701 - 01/04 1900  In: 480 [P.O.:480]  Out: -   Last three shifts: 01/02 1901 - 01/04 0700  In: 1040 [P.O.:1040]  Out: 1200 [Urine:1200]    Physical Exam:   General:  Alert, cooperative, no distress, appears stated age. Eyes:  Conjunctivae/corneas clear. PERRL, EOMs intact. Fundi benign   Ears:  Normal TMs and external ear canals both ears. Nose: Nares normal. Septum midline. Mucosa normal. No drainage or sinus tenderness. Mouth/Throat: Lips, mucosa, and tongue normal. Teeth and gums normal.   Neck: Supple, symmetrical, trachea midline, no adenopathy, thyroid: no enlargment/tenderness/nodules, no carotid bruit and no JVD. Back:   Symmetric, no curvature. ROM normal. No CVA tenderness. Lungs:   Clear to auscultation bilaterally. Heart:  Regular rate and rhythm, S1, S2 normal, no murmur, click, rub or gallop. Abdomen:   Soft, non-tender. Bowel sounds normal. No masses,  No organomegaly. Extremities: Extremities normal, atraumatic, no cyanosis or edema. Pulses: 2+ and symmetric all extremities. Skin: Skin color, texture, turgor normal. No rashes or lesions   Lymph nodes: Cervical, supraclavicular, and axillary nodes normal.   Neurologic: CNII-XII intact. Normal strength, sensation and reflexes throughout. Lab/Data Review:   All lab results for the last 24 hours reviewed. No results found for this or any previous visit (from the past 24 hour(s)).       Assessment:     Active Problems:    Hypertensive emergency (12/22/2021)    New onset seizure  Bradycardia  Thrombocytopenia  Hypertension    Plan:     Continue present work-up adjust medication for better blood pressure control    On amlodipine 5 mg twice a day  Clonidine 0.2 mg twice a day  Hydralazine 50 mg twice a day    Keppra 500 mg twice daily    Repeat the labs in the morning    PT OT consult    Discharge planning to SNF care    Signed By: Maritza Arguelles MD     January 4, 2022

## 2022-01-05 PROCEDURE — 74011250637 HC RX REV CODE- 250/637: Performed by: FAMILY MEDICINE

## 2022-01-05 PROCEDURE — 65270000029 HC RM PRIVATE

## 2022-01-05 PROCEDURE — 74011250636 HC RX REV CODE- 250/636: Performed by: INTERNAL MEDICINE

## 2022-01-05 PROCEDURE — 74011250637 HC RX REV CODE- 250/637: Performed by: INTERNAL MEDICINE

## 2022-01-05 RX ADMIN — LEVETIRACETAM 500 MG: 500 TABLET, FILM COATED ORAL at 09:42

## 2022-01-05 RX ADMIN — HYDRALAZINE HYDROCHLORIDE 50 MG: 50 TABLET, FILM COATED ORAL at 09:42

## 2022-01-05 RX ADMIN — BISACODYL 5 MG: 5 TABLET, COATED ORAL at 09:42

## 2022-01-05 RX ADMIN — AMLODIPINE BESYLATE 5 MG: 5 TABLET ORAL at 09:42

## 2022-01-05 RX ADMIN — SORBITOL SOLUTION (BULK) 30 ML: 70 SOLUTION at 09:42

## 2022-01-05 RX ADMIN — OXYCODONE 5 MG: 5 TABLET ORAL at 23:13

## 2022-01-05 RX ADMIN — HYDRALAZINE HYDROCHLORIDE 50 MG: 50 TABLET, FILM COATED ORAL at 23:12

## 2022-01-05 RX ADMIN — LEVETIRACETAM 500 MG: 500 TABLET, FILM COATED ORAL at 23:12

## 2022-01-05 RX ADMIN — ENOXAPARIN SODIUM 40 MG: 100 INJECTION SUBCUTANEOUS at 14:30

## 2022-01-05 RX ADMIN — OXYCODONE 5 MG: 5 TABLET ORAL at 09:42

## 2022-01-05 RX ADMIN — CLONIDINE HYDROCHLORIDE 0.2 MG: 0.2 TABLET ORAL at 23:13

## 2022-01-05 RX ADMIN — CLONIDINE HYDROCHLORIDE 0.2 MG: 0.2 TABLET ORAL at 09:42

## 2022-01-05 RX ADMIN — AMLODIPINE BESYLATE 5 MG: 5 TABLET ORAL at 23:12

## 2022-01-05 NOTE — PROGRESS NOTES
Progress Note    Patient: Yeison Menon MRN: [id number]  SSN: [id number]    YOB: 1955  Age: 78 y.o. Sex: female      Admit Date: 12/22/2021    LOS: 14 days     Subjective:     78years old lady possible new onset seizures with metabolic encephalopathy    Patient is alert awake not in any distress    Objective:     Vitals:    01/04/22 0941 01/04/22 1711 01/04/22 1952 01/05/22 0936   BP: (!) 164/95 133/81 139/79 (!) 169/94   Pulse: (!) 55 (!) 55 (!) 59 (!) 58   Resp: 18 18 19 20   Temp: 97.7 °F (36.5 °C) 98.8 °F (37.1 °C) 98.8 °F (37.1 °C) 98.5 °F (36.9 °C)   SpO2: 100% 99% 100% 99%   Weight:       Height:            Intake and Output:  Current Shift: No intake/output data recorded. Last three shifts: 01/03 1901 - 01/05 0700  In: 5 [P.O.:720]  Out: -     Physical Exam:   General:  Alert, cooperative, no distress, appears stated age. Eyes:  Conjunctivae/corneas clear. PERRL, EOMs intact. Fundi benign   Ears:  Normal TMs and external ear canals both ears. Nose: Nares normal. Septum midline. Mucosa normal. No drainage or sinus tenderness. Mouth/Throat: Lips, mucosa, and tongue normal. Teeth and gums normal.   Neck: Supple, symmetrical, trachea midline, no adenopathy, thyroid: no enlargment/tenderness/nodules, no carotid bruit and no JVD. Back:   Symmetric, no curvature. ROM normal. No CVA tenderness. Lungs:   Clear to auscultation bilaterally. Heart:  Regular rate and rhythm, S1, S2 normal, no murmur, click, rub or gallop. Abdomen:   Soft, non-tender. Bowel sounds normal. No masses,  No organomegaly. Extremities: Extremities normal, atraumatic, no cyanosis or edema. Pulses: 2+ and symmetric all extremities. Skin: Skin color, texture, turgor normal. No rashes or lesions   Lymph nodes: Cervical, supraclavicular, and axillary nodes normal.   Neurologic: CNII-XII intact. Normal strength, sensation and reflexes throughout. Lab/Data Review:   All lab results for the last 24 hours reviewed. No results found for this or any previous visit (from the past 24 hour(s)).       Assessment:     Active Problems:    Hypertensive emergency (12/22/2021)    New onset seizure  Bradycardia  Thrombocytopenia  Hypertension    Plan:     Continue present work-up adjust medication for better blood pressure control    On amlodipine 5 mg twice a day  Clonidine 0.2 mg twice a day  Hydralazine 50 mg twice a day    Keppra 500 mg twice daily    Repeat the labs in the morning    PT OT consult    Discharge planning to SNF care    Signed By: Palma Yates MD     January 5, 2022

## 2022-01-05 NOTE — PROGRESS NOTES
NEURO PROGRESS NOTE        SUBJECTIVE:Minimal  Seizures  Mental status changes      EXAM:  Awake responds verbally though still slightly confused  Follows one-step commands repeated coaxing  No seizures reported      ASSESSMENT/PLAN:    Continue antiseizure treatment  ALLERGIES:    No Known Allergies    MEDS:      Current Facility-Administered Medications:     sorbitoL 70 % solution 30 mL, 30 mL, Oral, DAILY PRN, Sharon Ring MD, 30 mL at 01/05/22 0942    hydrALAZINE (APRESOLINE) tablet 50 mg, 50 mg, Oral, BID, Sharon Ring MD, 50 mg at 01/05/22 0942    levETIRAcetam (KEPPRA) tablet 500 mg, 500 mg, Oral, BID, Sharon Ring MD, 500 mg at 01/05/22 0942    enoxaparin (LOVENOX) injection 40 mg, 40 mg, SubCUTAneous, Q24H, Anderson Barnard MD, 40 mg at 01/05/22 1430    cloNIDine HCL (CATAPRES) tablet 0.2 mg, 0.2 mg, Oral, BID, Anderson Barnard MD, 0.2 mg at 01/05/22 0942    amLODIPine (NORVASC) tablet 5 mg, 5 mg, Oral, BID, Anderson Barnard MD, 5 mg at 01/05/22 1315    hydrALAZINE (APRESOLINE) 20 mg/mL injection 10 mg, 10 mg, IntraVENous, Q6H PRN, Anderson Syed MD, 10 mg at 01/02/22 0252    sodium chloride (NS) flush 5-40 mL, 5-40 mL, IntraVENous, PRN, Anderson Barnard MD    LORazepam (ATIVAN) injection 1 mg, 1 mg, IntraVENous, Q4H PRN, Anderson Barnard MD    oxyCODONE IR (ROXICODONE) tablet 5 mg, 5 mg, Oral, Q4H PRN, Marie BIRD MD, 5 mg at 01/05/22 0600    bisacodyL (DULCOLAX) tablet 5 mg, 5 mg, Oral, DAILY PRN, Marie BIRD MD, 5 mg at 01/05/22 5779    LABS:  No results found for this or any previous visit (from the past 24 hour(s)). Visit Vitals  /72 (BP 1 Location: Right upper arm)   Pulse (!) 58   Temp 98.5 °F (36.9 °C)   Resp 20   Ht 5' 4.02\" (1.626 m)   Wt 52.2 kg (115 lb)   SpO2 99%   BMI 19.73 kg/m²       Imaging:  MRI BRAIN WO CONT   Final Result   1. No acute intracranial abnormality evident.    2. Extensive high T2 signal throughout the white matter is evident. 3. Stable cystic encephalomalacia right parietal lobe. 4. Generalized cerebral atrophy. No significant change from previous CT   examination of 3/21/2021. CT CODE NEURO HEAD WO CONTRAST   Final Result   No acute intracranial process. Other findings as above.       Findings conveyed to Dr. Carver Seip on 12/22/2021 at 4:56 PM.

## 2022-01-06 PROCEDURE — 97530 THERAPEUTIC ACTIVITIES: CPT

## 2022-01-06 PROCEDURE — 65270000029 HC RM PRIVATE

## 2022-01-06 PROCEDURE — 74011250637 HC RX REV CODE- 250/637: Performed by: INTERNAL MEDICINE

## 2022-01-06 PROCEDURE — 74011250637 HC RX REV CODE- 250/637: Performed by: FAMILY MEDICINE

## 2022-01-06 PROCEDURE — 74011250636 HC RX REV CODE- 250/636: Performed by: INTERNAL MEDICINE

## 2022-01-06 RX ADMIN — AMLODIPINE BESYLATE 5 MG: 5 TABLET ORAL at 20:21

## 2022-01-06 RX ADMIN — ENOXAPARIN SODIUM 40 MG: 100 INJECTION SUBCUTANEOUS at 09:26

## 2022-01-06 RX ADMIN — CLONIDINE HYDROCHLORIDE 0.2 MG: 0.2 TABLET ORAL at 09:26

## 2022-01-06 RX ADMIN — LEVETIRACETAM 500 MG: 500 TABLET, FILM COATED ORAL at 20:21

## 2022-01-06 RX ADMIN — OXYCODONE 5 MG: 5 TABLET ORAL at 20:21

## 2022-01-06 RX ADMIN — HYDRALAZINE HYDROCHLORIDE 50 MG: 50 TABLET, FILM COATED ORAL at 09:26

## 2022-01-06 RX ADMIN — AMLODIPINE BESYLATE 5 MG: 5 TABLET ORAL at 09:26

## 2022-01-06 RX ADMIN — LEVETIRACETAM 500 MG: 500 TABLET, FILM COATED ORAL at 09:26

## 2022-01-06 RX ADMIN — CLONIDINE HYDROCHLORIDE 0.2 MG: 0.2 TABLET ORAL at 20:21

## 2022-01-06 RX ADMIN — HYDRALAZINE HYDROCHLORIDE 50 MG: 50 TABLET, FILM COATED ORAL at 20:21

## 2022-01-06 NOTE — PROGRESS NOTES
Nutrition Assessment     Type and Reason for Visit: Reassess (goal)    Nutrition Recommendations/Plan:   Continue w/ current diet   Magic cup x2/day   Ferny x2/day for wound healing   Rec' MVI   Monitor and Record wts, po/supplements & BMs in I/O     Nutrition Assessment:  Admitted for seizures and metabolic encephalopathy. Remains inpatient pending SNF placement. Limited interview d/t h/x of CVA. Appetite remains good w/ ave PO intake remains same >75% @ most meals. Noted receiving magic cup and applesauce w/ benoprotein x2/day, noted not eating the applesauce- will d/c. Add Ferny x2/day instead for wound healing. Reviewed updated labs and meds. No further nutritional concern. Malnutrition Assessment:  Malnutrition Status: Mild malnutrition     Estimated Daily Nutrient Needs:  Energy (kcal):  1566kcals (30kcal/kg)  Protein (g):  67g (1/3g/kg)       Fluid (ml/day):  1566ml    Nutrition Related Findings:  NFPE finding severe wasting to quadriceps and calves, likely d/t bed bound status. Denies issues with c/s, seems to prefers softer food. No reported n/v,c/d nor edema. Last BM (1/3). soft. Current Nutrition Therapies:  ADULT DIET Dysphagia - Soft & Bite Sized;  No Salt Added (3-4 gm)  ADULT ORAL NUTRITION SUPPLEMENT Lunch, Dinner; Frozen Supplement  ADULT ORAL NUTRITION SUPPLEMENT Lunch, Breakfast; Other Supplement; Beneprotein    Anthropometric Measures:  · Height:  5' 4\" (162.6 cm)  · Current Body Wt:  52.2 kg (115 lb)  · BMI: 19.7    Nutrition Diagnosis:   · Increased nutrient needs related to inadequate protein-energy intake as evidenced by wounds      Nutrition Intervention:  Food and/or Nutrient Delivery: Continue current diet,Modify oral nutrition supplement  Nutrition Education and Counseling: Education not indicated  Coordination of Nutrition Care: Continue to monitor while inpatient,Feeding assistance/environmental change    Goals:  Intakes >/=75% of EENs in >5 days, Wt maintenance within +/-0.5kg in >5 days, Signs of wound healing per nsg assessment in >5 days, Regular Bms every 1-2 days       Nutrition Monitoring and Evaluation:   Behavioral-Environmental Outcomes: None identified  Food/Nutrient Intake Outcomes: Food and nutrient intake  Physical Signs/Symptoms Outcomes: Meal time behavior,Skin,Weight    Discharge Planning:    No discharge needs at this time     Electronically signed by Milagros Almanza on 1/6/2022 at 1:41 PM    Contact Number: [phone]

## 2022-01-06 NOTE — PROGRESS NOTES
PHYSICAL THERAPY TREATMENT  Patient: Jann Suarez (49 y.o. female)  Date: 1/6/2022  Diagnosis: Hypertensive emergency [I16.1] <principal problem not specified>       Precautions:    Chart, physical therapy assessment, plan of care and goals were reviewed. ASSESSMENT  Patient continues with skilled PT services and is progressing towards goals. Pt semi supine in bed upon arrival and agreeable to session. Performed sup>sit with mod A x 2. Sat EOB with constant support ~10 minutes. Noted posterior lean and very rigid posture, able to correct when given manual assist. Pt performed STS with max A x 2, pt voiced fear of falling. Pt very anxious throughout session with all mobility. Completed seated therex at EOB, see details below. Pt returned to semi supine position with call bell in reach and needs met. Recommending d/c to SNF once medically appropriate. .     Current Level of Function Impacting Discharge (mobility/balance): assistance required for all mobility     Other factors to consider for discharge: PLOF, time since onset, severity of deficits         PLAN :  Patient continues to benefit from skilled intervention to address the above impairments. Continue treatment per established plan of care. to address goals. Recommendation for discharge: (in order for the patient to meet his/her long term goals)  Hilton Fountain    This discharge recommendation:  Has been made in collaboration with the attending provider and/or case management    IF patient discharges home will need the following DME: to be determined (TBD)       SUBJECTIVE:   Patient stated my back hurts    OBJECTIVE DATA SUMMARY:   Critical Behavior:  Neurologic State: Alert  Orientation Level: Oriented to person,Disoriented to situation,Disoriented to time,Oriented to place  Cognition: Decreased attention/concentration,Follows commands,Impaired decision making     Functional Mobility Training:  Bed Mobility:  Rolling:  Moderate assistance;Assist x2  Supine to Sit: Moderate assistance;Assist x2  Sit to Supine: Moderate assistance;Assist x2  Scooting: Total assistance    Transfers:  Sit to Stand: Maximum assistance; Additional time;Assist x2  Stand to Sit: Maximum assistance; Additional time;Assist x2    Balance:  Sitting: Impaired; With support  Sitting - Static: Poor (constant support)  Sitting - Dynamic: Poor (constant support)      Therapeutic Exercises:   1 x 10 LAQ  Reporting pain in L knee during LAQ    Pain Ratin/10 back pain  Pain in L knee    Activity Tolerance:   Fair and requires rest breaks  Please refer to the flowsheet for vital signs taken during this treatment. After treatment patient left in no apparent distress:   Supine in bed, Call bell within reach, Bed / chair alarm activated, and Side rails x 3    COMMUNICATION/COLLABORATION:   The patients plan of care was discussed with: Occupational therapy assistant and Registered nurse. OT/PT sessions occurred together for increased patient and clinician safety    Problem: Mobility Impaired (Adult and Pediatric)  Goal: *Acute Goals and Plan of Care (Insert Text)  Description: Patient will move from supine to sit and sit to supine , scoot up and down, and roll side to side in bed with moderate assistance  within 7 day(s). Patient will transfer from bed to chair and chair to bed with moderate assistance  using the least restrictive device within 7 day(s). Patient will improve static sitting balance to supervision within 1 week(s).   Outcome: Progressing Towards Goal       Tu Chairez PTA   Time Calculation: 24 mins

## 2022-01-06 NOTE — PROGRESS NOTES
Problem: Pressure Injury - Risk of  Goal: *Prevention of pressure injury  Description: Document Baldo Scale and appropriate interventions in the flowsheet.   Outcome: Progressing Towards Goal  Note: Pressure Injury Interventions:  Sensory Interventions: Assess changes in LOC,Float heels,Keep linens dry and wrinkle-free,Maintain/enhance activity level,Minimize linen layers,Monitor skin under medical devices,Pad between skin to skin,Pressure redistribution bed/mattress (bed type),Avoid rigorous massage over bony prominences,Assess need for specialty bed    Moisture Interventions: Absorbent underpads,Minimize layers,Maintain skin hydration (lotion/cream),Moisture barrier    Activity Interventions: PT/OT evaluation,Pressure redistribution bed/mattress(bed type)    Mobility Interventions: Pressure redistribution bed/mattress (bed type),HOB 30 degrees or less,PT/OT evaluation    Nutrition Interventions: Document food/fluid/supplement intake,Discuss nutritional consult with provider,Offer support with meals,snacks and hydration    Friction and Shear Interventions: Minimize layers,HOB 30 degrees or less,Apply protective barrier, creams and emollients

## 2022-01-06 NOTE — PROGRESS NOTES
OCCUPATIONAL THERAPY TREATMENT  Patient: Gloria Boyd (1 y.o. female)  Date: 1/6/2022  Diagnosis: Hypertensive emergency [I16.1] <principal problem not specified>       Precautions:    Chart, occupational therapy assessment, plan of care, and goals were reviewed. ASSESSMENT  Patient continues with skilled OT services and is progressing towards goals. Pt received semi supine in bed upon arrival and agreeable to session. Pt performed supine>sit with mod A x 2. Sat EOB with constant support 10 minutes. Noted posterior lean and very rigid posture, able to correct when provided assist. Pt performed sit-> stand with max A x 2, pt voiced fear of falling. Pt very anxious throughout session with all mobility and transfers. Completed seated therex at EOB, see details below. Pt returned to semi supine position with call bell in reach and needs met. Recommending d/c to SNF once medically appropriate. Current Level of Function Impacting Discharge (ADLs): assistance with all bed mobility and sitting at EOB, anxiousness limiting participation in tx session. Other factors to consider for discharge: PLOF, time since on set         PLAN :  Patient continues to benefit from skilled intervention to address the above impairments. Continue treatment per established plan of care. to address goals. Recommendation for discharge: (in order for the patient to meet his/her long term goals)  Therapy up to 5 days/week in SNF setting    This discharge recommendation:  Has been made in collaboration with the attending provider and/or case management    IF patient discharges home will need the following DME:TBD       SUBJECTIVE:   Patient stated  I'm scared im going to fall ! Gustavo Holley    OBJECTIVE DATA SUMMARY:   Cognitive/Behavioral Status:  Neurologic State: Alert     Cognition: Decreased attention/concentration; Follows commands; Impaired decision making             Functional Mobility and Transfers for ADLs:  Bed Mobility:  Rolling: Moderate assistance;Assist x2  Supine to Sit: Moderate assistance;Assist x2  Sit to Supine: Moderate assistance;Assist x2  Scooting: Total assistance    Transfers:  Sit to Stand: Maximum assistance; Additional time;Assist x2          Balance:  Sitting: Impaired; With support  Sitting - Static: Poor (constant support)  Sitting - Dynamic: Poor (constant support)    Therapeutic Exercises: ant UE's  Exercise Sets Reps AROM AAROM PROM Self PROM Comments   Shoulder flex/ext 1 10 [x] [] [] []    Elbow flex/ext 1 10 [x] [] [] []    Wrist flex/ext 1 10 [x] [] [] []       [] [] [] []          Pain:  6/10 back pain  Pain in L knee    Activity Tolerance:   Fair  Please refer to the flowsheet for vital signs taken during this treatment. After treatment patient left in no apparent distress:   Supine in bed and Call bell within reach    COMMUNICATION/COLLABORATION:   The patients plan of care was discussed with: Physical therapy assistant and Registered nurse. Cotx with PTA for increased assistance with bed mobility and transfer. Zack Mei  Time Calculation: 24 mins    Problem: Self Care Deficits Care Plan (Adult)  Goal: *Acute Goals and Plan of Care (Insert Text)  Description: 1. Pt will be min A sup <> sit in prep for EOB ADLs  2. Pt will be min A grooming sitting EOB  3. Pt will be min A LE dressing sitting EOB/long sit  4. Pt will be mod A sit <>  prep for toileting LRAD  5. Pt will be mod A toileting/toilet transfer/cloth mgmt LRAD  6.   Pt will be mod A following UE HEP in prep for self care tasks     Outcome: Progressing Towards Goal

## 2022-01-06 NOTE — PROGRESS NOTES
Progress Note    Patient: Monty Guy MRN: [id number]  SSN: [id number]    YOB: 1955  Age: 78 y.o. Sex: female      Admit Date: 12/22/2021    LOS: 15 days     Subjective:     78years old lady possible new onset seizures with metabolic encephalopathy    Patient is alert awake not in any distress    Objective:     Vitals:    01/05/22 0936 01/05/22 1514 01/05/22 2050 01/06/22 0925   BP: (!) 169/94 120/72 134/67 (!) 146/80   Pulse: (!) 58 (!) 58 67 60   Resp: 20  18 18   Temp: 98.5 °F (36.9 °C) 98.5 °F (36.9 °C) 98.7 °F (37.1 °C) 97.8 °F (36.6 °C)   SpO2: 99% 99% 98% 98%   Weight:       Height:            Intake and Output:  Current Shift: 01/06 0701 - 01/06 1900  In: 480 [P.O.:480]  Out: -   Last three shifts: No intake/output data recorded. Physical Exam:   General:  Alert, cooperative, no distress, appears stated age. Eyes:  Conjunctivae/corneas clear. PERRL, EOMs intact. Fundi benign   Ears:  Normal TMs and external ear canals both ears. Nose: Nares normal. Septum midline. Mucosa normal. No drainage or sinus tenderness. Mouth/Throat: Lips, mucosa, and tongue normal. Teeth and gums normal.   Neck: Supple, symmetrical, trachea midline, no adenopathy, thyroid: no enlargment/tenderness/nodules, no carotid bruit and no JVD. Back:   Symmetric, no curvature. ROM normal. No CVA tenderness. Lungs:   Clear to auscultation bilaterally. Heart:  Regular rate and rhythm, S1, S2 normal, no murmur, click, rub or gallop. Abdomen:   Soft, non-tender. Bowel sounds normal. No masses,  No organomegaly. Extremities: Extremities normal, atraumatic, no cyanosis or edema. Pulses: 2+ and symmetric all extremities. Skin: Skin color, texture, turgor normal. No rashes or lesions   Lymph nodes: Cervical, supraclavicular, and axillary nodes normal.   Neurologic: CNII-XII intact. Normal strength, sensation and reflexes throughout. Lab/Data Review:   All lab results for the last 24 hours reviewed. No results found for this or any previous visit (from the past 24 hour(s)).       Assessment:     Active Problems:    Hypertensive emergency (12/22/2021)    New onset seizure  Bradycardia  Thrombocytopenia  Hypertension    Plan:     Continue present work-up adjust medication for better blood pressure control    On amlodipine 5 mg twice a day  Clonidine 0.2 mg twice a day  Hydralazine 50 mg twice a day    Keppra 500 mg twice daily    Repeat the labs in the morning    PT OT consult    Discharge planning to SNF care    Signed By: Martell Lewis MD     January 6, 2022

## 2022-01-06 NOTE — PROGRESS NOTES
CM sent updated notes to [facility]. CM confirmed they have started Pratima Chiang today. CM will continue to follow. Anticipate auth in 24 to 48 hours.

## 2022-01-06 NOTE — PROGRESS NOTES
NEURO PROGRESS NOTE        SUBJECTIVE:   Seizures  Mental status changes      EXAM:  Awake, responds verbally  Follows one-step commands with some coaxing  No seizures reported      ASSESSMENT/PLAN:  No antiseizure medications    ALLERGIES:    No Known Allergies    MEDS:      Current Facility-Administered Medications:     sorbitoL 70 % solution 30 mL, 30 mL, Oral, DAILY PRN, Sharon Ring MD, 30 mL at 01/05/22 0942    hydrALAZINE (APRESOLINE) tablet 50 mg, 50 mg, Oral, BID, Sharon Ring MD, 50 mg at 01/06/22 0926    levETIRAcetam (KEPPRA) tablet 500 mg, 500 mg, Oral, BID, Sharon Ring MD, 500 mg at 01/06/22 0926    enoxaparin (LOVENOX) injection 40 mg, 40 mg, SubCUTAneous, Q24H, Anderson Barnard MD, 40 mg at 01/06/22 9065    cloNIDine HCL (CATAPRES) tablet 0.2 mg, 0.2 mg, Oral, BID, Anderson Barnard MD, 0.2 mg at 01/06/22 0926    amLODIPine (NORVASC) tablet 5 mg, 5 mg, Oral, BID, Anderson Barnard MD, 5 mg at 01/06/22 7610    hydrALAZINE (APRESOLINE) 20 mg/mL injection 10 mg, 10 mg, IntraVENous, Q6H PRN, Anderson Rankin MD, 10 mg at 01/02/22 0252    sodium chloride (NS) flush 5-40 mL, 5-40 mL, IntraVENous, PRN, Anderson Barnard MD    LORazepam (ATIVAN) injection 1 mg, 1 mg, IntraVENous, Q4H PRN, Anderson Rankin MD    oxyCODONE IR (ROXICODONE) tablet 5 mg, 5 mg, Oral, Q4H PRN, Anderson Rankin MD, 5 mg at 01/05/22 3079    bisacodyL (DULCOLAX) tablet 5 mg, 5 mg, Oral, DAILY PRN, Rob BIRD MD, 5 mg at 01/05/22 4143    LABS:  No results found for this or any previous visit (from the past 24 hour(s)). Visit Vitals  BP (!) 154/102 (BP 1 Location: Right upper arm, BP Patient Position: At rest)   Pulse 67   Temp 98.1 °F (36.7 °C)   Resp 18   Ht 5' 4\" (1.626 m)   Wt 52.2 kg (115 lb)   SpO2 99%   BMI 19.74 kg/m²       Imaging:  MRI BRAIN WO CONT   Final Result   1. No acute intracranial abnormality evident. 2. Extensive high T2 signal throughout the white matter is evident.    3. Stable cystic encephalomalacia right parietal lobe. 4. Generalized cerebral atrophy. No significant change from previous CT   examination of 3/21/2021. CT CODE NEURO HEAD WO CONTRAST   Final Result   No acute intracranial process. Other findings as above.       Findings conveyed to Dr. Rey Arboleda on 12/22/2021 at 4:56 PM.

## 2022-01-07 VITALS
DIASTOLIC BLOOD PRESSURE: 94 MMHG | WEIGHT: 115 LBS | OXYGEN SATURATION: 100 % | TEMPERATURE: 98.2 F | RESPIRATION RATE: 18 BRPM | SYSTOLIC BLOOD PRESSURE: 149 MMHG | HEIGHT: 64 IN | BODY MASS INDEX: 19.63 KG/M2 | HEART RATE: 57 BPM

## 2022-01-07 PROCEDURE — 74011250636 HC RX REV CODE- 250/636: Performed by: FAMILY MEDICINE

## 2022-01-07 PROCEDURE — 97530 THERAPEUTIC ACTIVITIES: CPT

## 2022-01-07 PROCEDURE — 74011250637 HC RX REV CODE- 250/637: Performed by: FAMILY MEDICINE

## 2022-01-07 PROCEDURE — 74011250637 HC RX REV CODE- 250/637: Performed by: INTERNAL MEDICINE

## 2022-01-07 PROCEDURE — 90686 IIV4 VACC NO PRSV 0.5 ML IM: CPT | Performed by: FAMILY MEDICINE

## 2022-01-07 PROCEDURE — 90471 IMMUNIZATION ADMIN: CPT

## 2022-01-07 RX ORDER — OXYCODONE HYDROCHLORIDE 5 MG/1
5 TABLET ORAL
Qty: 10 TABLET | Refills: 0 | Status: SHIPPED | OUTPATIENT
Start: 2022-01-07 | End: 2022-01-10

## 2022-01-07 RX ADMIN — AMLODIPINE BESYLATE 5 MG: 5 TABLET ORAL at 09:36

## 2022-01-07 RX ADMIN — SORBITOL SOLUTION (BULK) 30 ML: 70 SOLUTION at 09:36

## 2022-01-07 RX ADMIN — INFLUENZA VIRUS VACCINE 0.5 ML: 15; 15; 15; 15 SUSPENSION INTRAMUSCULAR at 14:26

## 2022-01-07 RX ADMIN — CLONIDINE HYDROCHLORIDE 0.2 MG: 0.2 TABLET ORAL at 09:36

## 2022-01-07 RX ADMIN — OXYCODONE 5 MG: 5 TABLET ORAL at 09:36

## 2022-01-07 RX ADMIN — HYDRALAZINE HYDROCHLORIDE 50 MG: 50 TABLET, FILM COATED ORAL at 09:36

## 2022-01-07 RX ADMIN — LEVETIRACETAM 500 MG: 500 TABLET, FILM COATED ORAL at 09:36

## 2022-01-07 NOTE — PROGRESS NOTES
Transportation packet given to [address]. Pickup time : 180. Primary RN notified. Primary RN will call report. IV removed. Patient is ready for discharge and awaiting pickup at this time. Discharge plan of care/case management plan validated with provider discharge order.

## 2022-01-07 NOTE — PROGRESS NOTES
OCCUPATIONAL THERAPY TREATMENT  Patient: Andrés Ibarra (89 y.o. female)  Date: 1/7/2022  Diagnosis: Hypertensive emergency [I16.1] <principal problem not specified>       Precautions:    Chart, occupational therapy assessment, plan of care, and goals were reviewed. ASSESSMENT  Patient continues with skilled OT services and is progressing towards goals. Pt.received semi supine in bed upon arrival and agreeable to session. Pt.  Performed supine>sit with max A x 2. Pt. Sat EOB with constant support  for approx. 5 minutes. Noted posterior lean and very rigid posture, able to correct when given manual assist but required constant support while sitting at EOB. Pt very anxious throughout session while performing all aspects of bed mobility and sitting at EOB. Pt requesting to return semi-supine d/t increased anxiousness and fear of falling while seated at EOB and pt stated pain in back. Pt. soiled in urine during session, required mod A x 2 for rolling L/R and total A for UB and LB bathing. Pt. returned to semi supine position with call bell in reach and needs met. Recommending discharge to SNF once medically appropriate. Current Level of Function Impacting Discharge (ADLs): assistance  assistance with all bed mobility and sitting at EOB, total A ADL's, decreased safety awareness, anxiousness limiting participation in tx session. Other factors to consider for discharge: PLOF, time since on set         PLAN :  Patient continues to benefit from skilled intervention to address the above impairments. Continue treatment per established plan of care. to address goals.     Recommendation for discharge: (in order for the patient to meet his/her long term goals)  Therapy up to 5 days/week in SNF setting    This discharge recommendation:  Has been made in collaboration with the attending provider and/or case management    IF patient discharges home will need the following DME: TBD       SUBJECTIVE:   Patient stated  I need to lay back down! Arley Griffiths    OBJECTIVE DATA SUMMARY:   Cognitive/Behavioral Status:  Neurologic State: Alert  Orientation Level: Oriented to person  Cognition: Decreased attention/concentration;Decreased command following  Functional Mobility and Transfers for ADLs:  Bed Mobility:  Rolling: Moderate assistance;Assist x2  Supine to Sit: Maximum assistance;Assist x2  Sit to Supine: Moderate assistance;Assist x2  Scooting: Total assistance      Balance:  Sitting: Impaired; With support  Sitting - Static: Poor (constant support)  Sitting - Dynamic: Poor (constant support)    ADL Intervention:    Upper Body Bathing  Bathing Assistance: Total assistance(dependent)  Position Performed: Other (comment) (supine in bed)    Lower Body Bathing  Bathing Assistance: Total assistance(dependent)  Perineal  : Total assistance (dependent)  Position Performed: Supine    Upper Body Dressing Assistance  Dressing Assistance: Total assistance(dependent)  Hospital Gown: Total assistance (dependent)    Therapeutic Exercises: ant UE';s  Exercise Sets Reps AROM AAROM PROM Self PROM Comments   Shoulder flex/ext 1 5 [] [x] [] []    Elbow flex/ext 1 5 [] [x] [] []    Wrist flex/ext 1 5 [] [x] [] []       [] [] [] []          Pain:  Reporting back pain, unable to rate    Activity Tolerance:   Fair  Please refer to the flowsheet for vital signs taken during this treatment. After treatment patient left in no apparent distress:   Supine in bed, Call bell within reach, and Side rails x 3    COMMUNICATION/COLLABORATION:   The patients plan of care was discussed with: Physical therapy assistant. Co-tx with PTA for increased assistance with transfers and bed mobility. Raiza Talbert  Time Calculation: 32 mins    Problem: Self Care Deficits Care Plan (Adult)  Goal: *Acute Goals and Plan of Care (Insert Text)  Description: 1. Pt will be min A sup <> sit in prep for EOB ADLs  2. Pt will be min A grooming sitting EOB  3.   Pt will be min A LE dressing sitting EOB/long sit  4. Pt will be mod A sit <>  prep for toileting LRAD  5. Pt will be mod A toileting/toilet transfer/cloth mgmt LRAD  6.   Pt will be mod A following UE HEP in prep for self care tasks     Outcome: Progressing Towards Goal

## 2022-01-07 NOTE — PROGRESS NOTES
PHYSICAL THERAPY TREATMENT  Patient: Ajay Garza (89 y.o. female)  Date: 1/7/2022  Diagnosis: Hypertensive emergency [I16.1] <principal problem not specified>       Precautions:    Chart, physical therapy assessment, plan of care and goals were reviewed. ASSESSMENT  Patient continues with skilled PT services and is progressing towards goals. Pt semi supine in bed upon arrival and agreeable to session. Completed bed therex next, see details below. Performed sup>sit with max A x 2. Sat EOB with constant support ~5 minutes. Noted posterior lean and very rigid posture, able to correct when given manual assist. Pt very anxious throughout session with all mobility. Pt soiled in urine during session, required mod A x 2 for rolling L/R and required additional time for clean up. Pt returned to semi supine position with call bell in reach and needs met. Recommending d/c to SNF once medically appropriate. Current Level of Function Impacting Discharge (mobility/balance): assistance required for all mobility     Other factors to consider for discharge: PLOF, time since onset, severity of deficits, pt anxious with mobility          PLAN :  Patient continues to benefit from skilled intervention to address the above impairments. Continue treatment per established plan of care. to address goals. Recommendation for discharge: (in order for the patient to meet his/her long term goals)  Hilton Fountain    This discharge recommendation:  Has been made in collaboration with the attending provider and/or case management    IF patient discharges home will need the following DME: to be determined (TBD)       SUBJECTIVE:   Patient stated i'm Retia Bark fall I'm gonna fall!     OBJECTIVE DATA SUMMARY:   Critical Behavior:  Neurologic State: Alert  Orientation Level: Oriented to person  Cognition: Decreased attention/concentration,Decreased command following    Functional Mobility Training:  Bed Mobility:  Rolling: Moderate assistance;Assist x2  Supine to Sit: Maximum assistance;Assist x2  Sit to Supine: Moderate assistance;Assist x2  Scooting: Total assistance    Balance:  Sitting: Impaired; With support  Sitting - Static: Poor (constant support)  Sitting - Dynamic: Poor (constant support)      Therapeutic Exercises:   1 x 20 AP  1 x 10 heel slides  1 x 10 SLR    Pain Rating:  Reporting back pain, unable to rate    Activity Tolerance:   Fair  Please refer to the flowsheet for vital signs taken during this treatment. After treatment patient left in no apparent distress:   Supine in bed, Call bell within reach, Bed / chair alarm activated, and Side rails x 3    COMMUNICATION/COLLABORATION:   The patients plan of care was discussed with: Occupational therapy assistant. OT/PT sessions occurred together for increased patient and clinician safety      Problem: Mobility Impaired (Adult and Pediatric)  Goal: *Acute Goals and Plan of Care (Insert Text)  Description: Patient will move from supine to sit and sit to supine , scoot up and down, and roll side to side in bed with moderate assistance  within 7 day(s). Patient will transfer from bed to chair and chair to bed with moderate assistance  using the least restrictive device within 7 day(s). Patient will improve static sitting balance to supervision within 1 week(s).   Outcome: Progressing Towards Goal       Ashwin Cleveland, PTA   Time Calculation: 40 mins

## 2022-01-07 NOTE — DISCHARGE SUMMARY
Discharge Summary       PATIENT ID: Enoc Ruiz  MRN: [id number]   YOB: 1955    DATE OF ADMISSION: 12/22/2021  1:53 PM    DATE OF DISCHARGE:   PRIMARY CARE PROVIDER: Cate Simon MD     ATTENDING PHYSICIAN: Jose Alejandro Farrar  DISCHARGING PROVIDER: Comfort Ring      CONSULTATIONS: IP CONSULT TO NEUROLOGY    PROCEDURES/SURGERIES: * No surgery found *    ADMITTING DIAGNOSES:    Patient Active Problem List    Diagnosis Date Noted    Hypertensive emergency 12/22/2021       DISCHARGE DIAGNOSES / PLAN:      New onset seizure  Bradycardia  Thrombocytopenia  Hypertension        DISCHARGE MEDICATIONS:  Current Discharge Medication List      START taking these medications    Details   oxyCODONE IR (ROXICODONE) 5 mg immediate release tablet Take 1 Tablet by mouth every four (4) hours as needed for Pain for up to 3 days. Max Daily Amount: 30 mg.  Qty: 10 Tablet, Refills: 0  Start date: 1/7/2022, End date: 1/10/2022    Associated Diagnoses: Hypertensive emergency         CONTINUE these medications which have NOT CHANGED    Details   hydrALAZINE (APRESOLINE) 100 mg tablet Take 100 mg by mouth four (4) times daily. levETIRAcetam (Keppra) 500 mg tablet Take 500 mg by mouth two (2) times a day. amLODIPine (NORVASC) 5 mg tablet Take 5 mg by mouth daily. cloNIDine HCl (CATAPRES) 0.2 mg tablet Take 0.2 mg by mouth two (2) times a day. STOP taking these medications       divalproex DR (Depakote) 500 mg tablet Comments:   Reason for Stopping:         labetaloL (NORMODYNE) 100 mg tablet Comments:   Reason for Stopping:         levoFLOXacin (LEVAQUIN) 750 mg tablet Comments:   Reason for Stopping:                 NOTIFY YOUR PHYSICIAN FOR ANY OF THE FOLLOWING:   Fever over 101 degrees for 24 hours. Chest pain, shortness of breath, fever, chills, nausea, vomiting, diarrhea, change in mentation, falling, weakness, bleeding. Severe pain or pain not relieved by medications.   Or, any other signs or symptoms that you may have questions about. DISPOSITION:  x  Home With:   OT  PT  HH  RN       Long term SNF/Inpatient Rehab    Independent/assisted living    Hospice    Other:       PATIENT CONDITION AT DISCHARGE: Stable      PHYSICAL EXAMINATION AT DISCHARGE:  General:          Alert, cooperative, no distress, appears stated age. HEENT:           Atraumatic, anicteric sclerae, pink conjunctivae                          No oral ulcers, mucosa moist, throat clear, dentition fair  Neck:               Supple, symmetrical  Lungs:             Clear to auscultation bilaterally. No Wheezing or Rhonchi. No rales. Chest wall:      No tenderness  No Accessory muscle use. Heart:              Regular  rhythm,  No  murmur   No edema  Abdomen:        Soft, non-tender. Not distended. Bowel sounds normal  Extremities:     No cyanosis. No clubbing,                            Skin turgor normal, Capillary refill normal  Skin:                Not pale. Not Jaundiced  No rashes   Psych:             Not anxious or agitated. Neurologic:      Alert, moves all extremities, answers questions appropriately and responds to commands     MRI BRAIN WO CONT   Final Result   1. No acute intracranial abnormality evident. 2. Extensive high T2 signal throughout the white matter is evident. 3. Stable cystic encephalomalacia right parietal lobe. 4. Generalized cerebral atrophy. No significant change from previous CT   examination of 3/21/2021. CT CODE NEURO HEAD WO CONTRAST   Final Result   No acute intracranial process. Other findings as above. Findings conveyed to Dr. Kiki Stein on 12/22/2021 at 4:56 PM.                  No results found for this or any previous visit (from the past 24 hour(s)).        HOSPITAL COURSE:    History of present illness please refer to history and physical at the time of admission as a patient admitted because of seizure metabolic encephalopathy now patient stable denies any chest pain shortness of breath nausea no vomiting discharge to skilled care rehab in stable condition    Follow-up PCP in 1 to 2 weeks      Signed:   Christian Kim MD  1/7/2022  2:31 PM

## 2022-01-07 NOTE — PROGRESS NOTES
NEURO PROGRESS NOTE        SUBJECTIVE:   Seizures,  Mental status changes    EXAM:  Somnolent but arousable  Responds verbally but confused  No seizures reported  Moves all 4 extremities      ASSESSMENT/PLAN:  Continue antiseizure treatment    ALLERGIES:    No Known Allergies    MEDS:      Current Facility-Administered Medications:     sorbitoL 70 % solution 30 mL, 30 mL, Oral, DAILY PRN, Sharon Ring MD, 30 mL at 01/07/22 0936    hydrALAZINE (APRESOLINE) tablet 50 mg, 50 mg, Oral, BID, Sharon Ring MD, 50 mg at 01/07/22 0936    levETIRAcetam (KEPPRA) tablet 500 mg, 500 mg, Oral, BID, Sharon Ring MD, 500 mg at 01/07/22 0936    enoxaparin (LOVENOX) injection 40 mg, 40 mg, SubCUTAneous, Q24H, Anderson Barnard MD, 40 mg at 01/06/22 9094    cloNIDine HCL (CATAPRES) tablet 0.2 mg, 0.2 mg, Oral, BID, Anderson Barnard MD, 0.2 mg at 01/07/22 0936    amLODIPine (NORVASC) tablet 5 mg, 5 mg, Oral, BID, Anderson Barnard MD, 5 mg at 01/07/22 0936    hydrALAZINE (APRESOLINE) 20 mg/mL injection 10 mg, 10 mg, IntraVENous, Q6H PRN, Romayne Norfolk, Raphael I, MD, 10 mg at 01/02/22 0252    sodium chloride (NS) flush 5-40 mL, 5-40 mL, IntraVENous, PRN, Anderson Barnard MD    LORazepam (ATIVAN) injection 1 mg, 1 mg, IntraVENous, Q4H PRN, Anderson Barnard MD    oxyCODONE IR (ROXICODONE) tablet 5 mg, 5 mg, Oral, Q4H PRN, Mercedes BIRD MD, 5 mg at 01/07/22 0936    bisacodyL (DULCOLAX) tablet 5 mg, 5 mg, Oral, DAILY PRN, Mercedes BIRD MD, 5 mg at 01/05/22 8042    LABS:  No results found for this or any previous visit (from the past 24 hour(s)). Visit Vitals  BP (!) 156/102   Pulse (!) 59   Temp 97.8 °F (36.6 °C)   Resp 18   Ht 5' 4\" (1.626 m)   Wt 52.2 kg (115 lb)   SpO2 100%   BMI 19.74 kg/m²       Imaging:  MRI BRAIN WO CONT   Final Result   1. No acute intracranial abnormality evident. 2. Extensive high T2 signal throughout the white matter is evident.    3. Stable cystic encephalomalacia right parietal lobe.   4. Generalized cerebral atrophy. No significant change from previous CT   examination of 3/21/2021. CT CODE NEURO HEAD WO CONTRAST   Final Result   No acute intracranial process. Other findings as above.       Findings conveyed to Dr. Kiki Stein on 12/22/2021 at 4:56 PM.

## 2022-01-07 NOTE — PROGRESS NOTES
Problem: Pressure Injury - Risk of  Goal: *Prevention of pressure injury  Description: Document Baldo Scale and appropriate interventions in the flowsheet. Outcome: Progressing Towards Goal  Note: Pressure Injury Interventions:  Sensory Interventions: Assess changes in LOC,Assess need for specialty bed,Float heels,Keep linens dry and wrinkle-free,Maintain/enhance activity level,Minimize linen layers,Turn and reposition approx. every two hours (pillows and wedges if needed)    Moisture Interventions: Absorbent underpads,Apply protective barrier, creams and emollients,Assess need for specialty bed,Check for incontinence Q2 hours and as needed,Maintain skin hydration (lotion/cream),Minimize layers,Moisture barrier    Activity Interventions: Assess need for specialty bed,Increase time out of bed,PT/OT evaluation    Mobility Interventions: Assess need for specialty bed,HOB 30 degrees or less,PT/OT evaluation,Turn and reposition approx.  every two hours(pillow and wedges)    Nutrition Interventions: Document food/fluid/supplement intake,Discuss nutritional consult with provider,Offer support with meals,snacks and hydration    Friction and Shear Interventions: Apply protective barrier, creams and emollients,HOB 30 degrees or less,Lift team/patient mobility team,Lift sheet,Minimize layers,Transferring/repositioning devices

## 2022-01-07 NOTE — PROGRESS NOTES
[facility]. On room air. Alert and oriented x3. No distress noted. Discharge plan of care/case management plan validated with provider discharge order.

## 2022-01-07 NOTE — DISCHARGE INSTRUCTIONS
Discharge Instructions       PATIENT ID: Jann Suarez  MRN: [id number]   YOB: 1955    DATE OF ADMISSION: 12/22/2021  1:53 PM    DATE OF DISCHARGE: 1/7/2022    PRIMARY CARE PROVIDER: Koki Mcduffie MD     ATTENDING PHYSICIAN: Stephen Olmos MD  DISCHARGING PROVIDER: Tae Portillo MD    To contact this individual call [phone] and ask the  to ian. If unavailable ask to be transferred the Adult Hospitalist Department. DISCHARGE DIAGNOSES ***    CONSULTATIONS: IP CONSULT TO NEUROLOGY    PROCEDURES/SURGERIES: * No surgery found *    PENDING TEST RESULTS:   At the time of discharge the following test results are still pending: ***    FOLLOW UP APPOINTMENTS:   Follow-up Information     Follow up With Specialties Details Why Contact Info    Koki Mcduffie MD Internal Medicine In 1 week  [address]  [phone]             ADDITIONAL CARE RECOMMENDATIONS: ***    DIET: {diet:39902}      ACTIVITY: {discharge activity:00838}    Wound care: {[facility] Wound Care Instructions:06780}    EQUIPMENT needed: ***      DISCHARGE MEDICATIONS:   See Medication Reconciliation Form    · It is important that you take the medication exactly as they are prescribed. · Keep your medication in the bottles provided by the pharmacist and keep a list of the medication names, dosages, and times to be taken in your wallet. · Do not take other medications without consulting your doctor. NOTIFY YOUR PHYSICIAN FOR ANY OF THE FOLLOWING:   Fever over 101 degrees for 24 hours. Chest pain, shortness of breath, fever, chills, nausea, vomiting, diarrhea, change in mentation, falling, weakness, bleeding. Severe pain or pain not relieved by medications. Or, any other signs or symptoms that you may have questions about.       DISPOSITION:    Home With:   OT  PT  HH  RN       SNF/Inpatient Rehab/LTAC    Independent/assisted living    Hospice    Other: PROBLEM LIST Updated:  Yes ***       Signed:   Favio Tucker MD  1/7/2022  2:31 PM

## 2022-01-07 NOTE — PROGRESS NOTES
SW spoke w/[facility] (admissions/[facility]) @ [phone]. Informed admissions per the son patient isn't vaccinated, and desires for her to be. Per admissions \"we have a bed for a vaccinated person. We'll have to find a bed for her. \"  Admissions informed writer mckenna'emery Barrios for her. Admissions to keep writer posted on bed availability. Curley Sacks, MSW                11:08AM Outbound call to patient's son Scooter Cobos, @ [phone]. Identified self, role, and nature of the call. Patient identifier's (name & OB) verified per HIPAA. Inquired if his mother has been vaccinated w/Covid vaccine. \"No, she hasn't. I want her to get the [facility]. \"  Son then ask if his mother could receive the Covid vaccination while here at the hospital.  Verbalized will check w/the doctor, and get back to him.           Curley Sacks, MSW

## 2022-01-07 NOTE — PROGRESS NOTES
Transition of Care Plan:    RUR: 11 % Low    Disposition: [facility] at [facility]. Room 55A. RN to call report to [phone]. Follow up appointments: PCP & Specialists    DME needed: N/A    Transportation at Discharge: Transport    [place] or means to access home:  N/A        IM Medicare Letter: Medicare pt has received, reviewed, and signed 2nd IM letter informing them of their right to appeal the discharge. Signed copy has been placed on pt bedside chart. Is patient a BCPI-A Bundle:  N/A    If yes, was Bundle Letter given?:  N/A    Is patient a  and connected with the [place]? N/A    If yes, was [employer] transfer form completed and VA notified? N/A    Caregiver Contact: Yasir Bautista    Discharge Caregiver contacted prior to discharge? Yes      Patient scheduled to discharge today to [facility] at [facility]. Patient will go to Room 55A. Spoke w/son and informed of auth from insurance; and bed availability today. Son is in agreement w/discharge to SNF. Son desires/requests for his mother to get the flu shot prior to departing from the hospital.  Acknowledged understanding and voiced will inform attending and assigned LPN.         Herbie Feldman, ODETTE

## 2022-01-09 NOTE — PROCEDURES
[facility]  EEG    Name:  Augustus Nissen  MR#:  [id number]  :  1955  ACCOUNT #:  [de-identified]  DATE OF SERVICE:  2021    DIAGNOSIS:  Mental status changes. DESCRIPTION:  Recording is done digitally on computer. Electrodes are placed in a 10-20 international system. Initial recording is equipment calibration followed by biocalibration. Initial montages are bipolar montage. TECHNIQUE:  Tracing started with the patient having a background frequency of 7 Hz. As recording continues, the patent is hooked up on an EKG machine that shows a heart rate of 78. Tracings continue with the patient becoming drowsy. Photic stimulation and hyperventilation are not done. IMPRESSION:  This is an EEG showing the patient drowsy mostly.       Luis Katz MD      [id number]  D:  2022 16:56  T:  2022 1:38  JOB #:  [id number]